# Patient Record
Sex: FEMALE | Race: WHITE | NOT HISPANIC OR LATINO | Employment: FULL TIME | URBAN - METROPOLITAN AREA
[De-identification: names, ages, dates, MRNs, and addresses within clinical notes are randomized per-mention and may not be internally consistent; named-entity substitution may affect disease eponyms.]

---

## 2020-02-26 ENCOUNTER — LAB (OUTPATIENT)
Dept: LAB | Facility: HOSPITAL | Age: 25
End: 2020-02-26

## 2020-02-26 ENCOUNTER — TRANSCRIBE ORDERS (OUTPATIENT)
Dept: LAB | Facility: HOSPITAL | Age: 25
End: 2020-02-26

## 2020-02-26 DIAGNOSIS — Z3A.17 17 WEEKS GESTATION OF PREGNANCY: Primary | ICD-10-CM

## 2020-02-26 DIAGNOSIS — Z34.82 PRENATAL CARE, SUBSEQUENT PREGNANCY, SECOND TRIMESTER: ICD-10-CM

## 2020-02-26 DIAGNOSIS — Z3A.17 17 WEEKS GESTATION OF PREGNANCY: ICD-10-CM

## 2020-02-26 LAB
ABO GROUP BLD: NORMAL
BASOPHILS # BLD AUTO: 0.03 10*3/MM3 (ref 0–0.2)
BASOPHILS NFR BLD AUTO: 0.4 % (ref 0–1.5)
BLD GP AB SCN SERPL QL: NEGATIVE
DEPRECATED RDW RBC AUTO: 42.9 FL (ref 37–54)
EOSINOPHIL # BLD AUTO: 0.1 10*3/MM3 (ref 0–0.4)
EOSINOPHIL NFR BLD AUTO: 1.2 % (ref 0.3–6.2)
ERYTHROCYTE [DISTWIDTH] IN BLOOD BY AUTOMATED COUNT: 13.5 % (ref 12.3–15.4)
GLUCOSE BLD-MCNC: 87 MG/DL (ref 65–99)
HBV SURFACE AG SERPL QL IA: NORMAL
HCT VFR BLD AUTO: 37.8 % (ref 34–46.6)
HCV AB SER DONR QL: NORMAL
HGB BLD-MCNC: 13 G/DL (ref 12–15.9)
HIV1+2 AB SER QL: NORMAL
IMM GRANULOCYTES # BLD AUTO: 0.08 10*3/MM3 (ref 0–0.05)
IMM GRANULOCYTES NFR BLD AUTO: 0.9 % (ref 0–0.5)
LYMPHOCYTES # BLD AUTO: 1.74 10*3/MM3 (ref 0.7–3.1)
LYMPHOCYTES NFR BLD AUTO: 20.4 % (ref 19.6–45.3)
MCH RBC QN AUTO: 30.4 PG (ref 26.6–33)
MCHC RBC AUTO-ENTMCNC: 34.4 G/DL (ref 31.5–35.7)
MCV RBC AUTO: 88.3 FL (ref 79–97)
MONOCYTES # BLD AUTO: 0.39 10*3/MM3 (ref 0.1–0.9)
MONOCYTES NFR BLD AUTO: 4.6 % (ref 5–12)
NEUTROPHILS # BLD AUTO: 6.17 10*3/MM3 (ref 1.7–7)
NEUTROPHILS NFR BLD AUTO: 72.5 % (ref 42.7–76)
NRBC BLD AUTO-RTO: 0 /100 WBC (ref 0–0.2)
PLATELET # BLD AUTO: 223 10*3/MM3 (ref 140–450)
PMV BLD AUTO: 10.9 FL (ref 6–12)
RBC # BLD AUTO: 4.28 10*6/MM3 (ref 3.77–5.28)
RH BLD: POSITIVE
RPR SER QL: NORMAL
WBC NRBC COR # BLD: 8.51 10*3/MM3 (ref 3.4–10.8)

## 2020-02-26 PROCEDURE — 80081 OBSTETRIC PANEL INC HIV TSTG: CPT

## 2020-02-26 PROCEDURE — 86803 HEPATITIS C AB TEST: CPT

## 2020-02-26 PROCEDURE — 36415 COLL VENOUS BLD VENIPUNCTURE: CPT

## 2020-02-26 PROCEDURE — 82947 ASSAY GLUCOSE BLOOD QUANT: CPT

## 2020-02-27 LAB
HOLD SPECIMEN: NORMAL
RUBV IGG SERPL IA-ACNC: NORMAL

## 2020-04-22 ENCOUNTER — TRANSCRIBE ORDERS (OUTPATIENT)
Dept: LAB | Facility: HOSPITAL | Age: 25
End: 2020-04-22

## 2020-04-22 DIAGNOSIS — Z3A.28 28 WEEKS GESTATION OF PREGNANCY: Primary | ICD-10-CM

## 2020-04-22 DIAGNOSIS — Z34.83 PRENATAL CARE, SUBSEQUENT PREGNANCY, THIRD TRIMESTER: ICD-10-CM

## 2020-04-22 LAB
BLD GP AB SCN SERPL QL: NEGATIVE
DEPRECATED RDW RBC AUTO: 40.8 FL (ref 37–54)
ERYTHROCYTE [DISTWIDTH] IN BLOOD BY AUTOMATED COUNT: 12.2 % (ref 12.3–15.4)
GLUCOSE 1H P 100 G GLC PO SERPL-MCNC: 199 MG/DL (ref 65–140)
HCT VFR BLD AUTO: 34 % (ref 34–46.6)
HGB BLD-MCNC: 11.7 G/DL (ref 12–15.9)
MCH RBC QN AUTO: 31.5 PG (ref 26.6–33)
MCHC RBC AUTO-ENTMCNC: 34.4 G/DL (ref 31.5–35.7)
MCV RBC AUTO: 91.6 FL (ref 79–97)
PLATELET # BLD AUTO: 193 10*3/MM3 (ref 140–450)
PMV BLD AUTO: 10.1 FL (ref 6–12)
RBC # BLD AUTO: 3.71 10*6/MM3 (ref 3.77–5.28)
WBC NRBC COR # BLD: 8.19 10*3/MM3 (ref 3.4–10.8)

## 2020-04-22 PROCEDURE — 36415 COLL VENOUS BLD VENIPUNCTURE: CPT | Performed by: OBSTETRICS & GYNECOLOGY

## 2020-04-22 PROCEDURE — 82950 GLUCOSE TEST: CPT | Performed by: OBSTETRICS & GYNECOLOGY

## 2020-04-22 PROCEDURE — 85027 COMPLETE CBC AUTOMATED: CPT | Performed by: OBSTETRICS & GYNECOLOGY

## 2020-04-22 PROCEDURE — 86850 RBC ANTIBODY SCREEN: CPT | Performed by: OBSTETRICS & GYNECOLOGY

## 2020-04-25 ENCOUNTER — HOSPITAL ENCOUNTER (OUTPATIENT)
Facility: HOSPITAL | Age: 25
Setting detail: OBSERVATION
Discharge: HOME | End: 2020-04-25
Attending: OBSTETRICS & GYNECOLOGY | Admitting: OBSTETRICS & GYNECOLOGY

## 2020-04-25 VITALS
DIASTOLIC BLOOD PRESSURE: 80 MMHG | BODY MASS INDEX: 32.61 KG/M2 | OXYGEN SATURATION: 97 % | WEIGHT: 191 LBS | HEART RATE: 110 BPM | TEMPERATURE: 98.1 F | RESPIRATION RATE: 16 BRPM | SYSTOLIC BLOOD PRESSURE: 124 MMHG | HEIGHT: 64 IN

## 2020-04-25 PROBLEM — O36.8190 DECREASED FETAL MOVEMENT: Status: ACTIVE | Noted: 2020-04-25

## 2020-04-25 PROCEDURE — G0378 HOSPITAL OBSERVATION PER HR: HCPCS

## 2020-04-25 PROCEDURE — 99218 PR INITIAL OBSERVATION CARE/DAY 30 MINUTES: CPT | Performed by: OBSTETRICS & GYNECOLOGY

## 2020-04-25 PROCEDURE — 59025 FETAL NON-STRESS TEST: CPT | Performed by: OBSTETRICS & GYNECOLOGY

## 2020-04-25 PROCEDURE — 59025 FETAL NON-STRESS TEST: CPT

## 2020-04-25 RX ORDER — PRENATAL WITH FERROUS FUM AND FOLIC ACID 3080; 920; 120; 400; 22; 1.84; 3; 20; 10; 1; 12; 200; 27; 25; 2 [IU]/1; [IU]/1; MG/1; [IU]/1; MG/1; MG/1; MG/1; MG/1; MG/1; MG/1; UG/1; MG/1; MG/1; MG/1; MG/1
1 TABLET ORAL DAILY
COMMUNITY

## 2020-04-25 NOTE — SIGNIFICANT NOTE
EFM d/c. Reactive nst. Pt verbalizes understanding of discharge instructions and fetal kick count.

## 2020-04-25 NOTE — H&P
[facility]  Obstetric History and Physical    Referring Provider: Conchita John MD      Chief Complaint   Patient presents with   • Decreased Fetal Movement     24 hrs       Subjective     Patient is a 24 y.o. female  currently at 28w3d, who presents with C/O decreased fetal movement.  She reports decreased fetal movement without any predisposing or other associated symptoms or concerns.  She denies leaking of fluid, vaginal bleeding, regular activity or any other concerns.  Prenatal care by Dr. John.  She states failed her 1 hour glucose tolerance test.  Plan to have further evaluation.  She current reports normal fetal activity after presenting to labor and delivery.        The following portions of the patients history were reviewed and updated as appropriate: current medications, allergies, past medical history, past surgical history, past family history, past social history and problem list .       Prenatal Information:   Maternal Prenatal Labs  Blood Type No results found for: ABO   Rh Status No results found for: RH   Antibody Screen Antibody Screen   Date Value Ref Range Status   2020 Negative  Final      Gonnorhea No results found for: GCCX   Chlamydia No results found for: CLAMYDCU   RPR No results found for: RPR   Syphilis Antibody No results found for: SYPHILIS   Rubella No results found for: RUBELLAIGGIN   Hepatitis B Surface Antigen No results found for: HEPBSAG   HIV-1 Antibody No results found for: LABHIV1   Hepatitis C Antibody No results found for: HEPCAB   Rapid Urin Drug Screen No results found for: AMPMETHU, BARBITSCNUR, LABBENZSCN, LABMETHSCN, LABOPIASCN, THCURSCR, COCAINEUR, AMPHETSCREEN, PROPOXSCN, BUPRENORSCNU, METAMPSCNUR, OXYCODONESCN, TRICYCLICSCN   Group B Strep Culture No results found for: GBSANTIGEN           External Prenatal Results     Pregnancy Outside Results - Transcribed From Office Records - See Scanned Records For Details     Test Value Date Time    Hgb  11.7 g/dL 20 0955      13.0 g/dL 20 1215    Hct 34.0 % 20 0955      37.8 % 20 1215    ABO A  20 1215    Rh Positive  20 1215    Antibody Screen Negative  20 0955      Negative  20 1215    Glucose Fasting GTT       Glucose Tolerance Test 1 hour       Glucose Tolerance Test 3 hour       Gonorrhea (discrete)       Chlamydia (discrete)       RPR Non-Reactive  20 1215    VDRL       Syphilis Antibody       Rubella Equivocal  20 1215    HBsAg Non-Reactive  20 1215    Herpes Simplex Virus PCR       Herpes Simplex VIrus Culture       HIV Non-Reactive  20 1215    Hep C RNA Quant PCR       Hep C Antibody Non-Reactive  20 1215    AFP       Group B Strep       GBS Susceptibility to Clindamycin       GBS Susceptibility to Erythromycin       Fetal Fibronectin       Genetic Testing, Maternal Blood             Drug Screening     Test Value Date Time    Urine Drug Screen       Amphetamine Screen       Barbiturate Screen       Benzodiazepine Screen       Methadone Screen       Phencyclidine Screen       Opiates Screen       THC Screen       Cocaine Screen       Propoxyphene Screen       Buprenorphine Screen       Methamphetamine Screen       Oxycodone Screen       Tricyclic Antidepressants Screen                     Past OB History:       OB History    Para Term  AB Living   2 0 0 0 1 0   SAB TAB Ectopic Molar Multiple Live Births   1 0 0 0 0 0      # Outcome Date GA Lbr Aditya/2nd Weight Sex Delivery Anes PTL Lv   2 Current            1 SAB 2018 10w0d    SAB   FD       Past Medical History: Past Medical History:   Diagnosis Date   • Anxiety     situational   • Gestational diabetes 2020      Past Surgical History Past Surgical History:   Procedure Laterality Date   • D&C WITH SUCTION  2018    sab      Family History: Family History   Problem Relation Age of Onset   • Hypertension Father    • Dementia Father    • No Known Problems Mother     • No Known Problems Brother       Social History:  reports that she has never smoked. She has never used smokeless tobacco.   reports that she drank alcohol.   reports that she does not use drugs.                   General ROS Negative Findings:Headaches, Visual Changes, Epigastric pain, Anorexia, Nausia/Vomiting, ROM and Vaginal Bleeding    ROS     All other systems have been reviewed and are neg  Objective       Vital Signs Range for the last 24 hours  Temperature: Temp:  [98.1 °F (36.7 °C)] 98.1 °F (36.7 °C)   Temp Source: Temp src: Oral   BP: BP: (124-128)/(80-84) 124/80   Pulse: Heart Rate:  [103-110] 110   Respirations: Resp:  [16] 16   SPO2: SpO2:  [96 %-97 %] 97 %   O2 Amount (l/min):     O2 Devices     Weight: Weight:  [86.6 kg (191 lb)] 86.6 kg (191 lb)     Physical Examination:   General:   alert, appears stated age and cooperative   Skin:   normal   HEENT:  Sclera clear   Lungs:      Heart:      Abdomen:  Soft, gravid uterus, no guarding, rebound benign exam   Lower Extremities  trace edema, no calf transfer range of motion   Pelvis:  Exam deferred.     Neuro: No focal deficits noted DTR 2+ 4 no clonus    Presentation:    Cervix: Exam by:     Dilation:     Effacement:     Station:         Fetal Heart Rate Assessment   Method: Fetal HR Assessment Method: external   Beats/min: Fetal HR (beats/min): 130   Baseline: Fetal Heart Baseline Rate: normal range   Varibility: Fetal HR Variability: moderate (amplitude range 6 to 25 bpm)   Accels: Fetal HR Accelerations: greater than/equal to 15 bpm, lasting at least 15 seconds   Decels: Fetal HR Decelerations: absent   Tracing Category:     NST-indications decreased fetal movement, interpretation reactive, moderate variability, accelerations present 10 x 10, no decelerations noted, onset 0740 endtime 0930 no ctx  Uterine Assessment   Method: Method: external tocotransducer   Frequency (min): Contraction Frequency (Minutes): 0   Ctx Count in 10 min:     Duration:      Intensity:     Intensity by IUPC:     Resting Tone: Uterine Resting Tone: soft by palpation   Resting Tone by IUPC:     [place] Units:       Laboratory Results:   Lab Results (last 24 hours)     ** No results found for the last 24 hours. **        Radiology Review:   Imaging Results (Last 24 Hours)     ** No results found for the last 24 hours. **        Other Studies:    Assessment/Plan       Decreased fetal movement        Assessment:  1.  Intrauterine pregnancy at 28w3d weeks gestation with reactive fetal status.    2.  History of decreased fetal movement, currently reports normal fetal activity  3.   4.      Plan:  1. D/C to home, kick count, follow-up with OB routine or as needed  2. Plan of care has been reviewed with patient.  3.  Risks, benefits of treatment plan have been discussed.  4.  All questions have been answered.  5      Vikram Noyola DO  4/25/2020  09:23

## 2020-05-04 ENCOUNTER — OFFICE VISIT (OUTPATIENT)
Dept: ENDOCRINOLOGY | Facility: CLINIC | Age: 25
End: 2020-05-04

## 2020-05-04 VITALS
OXYGEN SATURATION: 98 % | SYSTOLIC BLOOD PRESSURE: 122 MMHG | DIASTOLIC BLOOD PRESSURE: 84 MMHG | WEIGHT: 199.8 LBS | BODY MASS INDEX: 34.11 KG/M2 | HEART RATE: 105 BPM | HEIGHT: 64 IN

## 2020-05-04 DIAGNOSIS — O24.419 GESTATIONAL DIABETES MELLITUS (GDM) IN THIRD TRIMESTER, GESTATIONAL DIABETES METHOD OF CONTROL UNSPECIFIED: Primary | ICD-10-CM

## 2020-05-04 LAB
GLUCOSE BLDC GLUCOMTR-MCNC: 89 MG/DL (ref 70–130)
HBA1C MFR BLD: 5.2 %

## 2020-05-04 PROCEDURE — 82947 ASSAY GLUCOSE BLOOD QUANT: CPT | Performed by: INTERNAL MEDICINE

## 2020-05-04 PROCEDURE — 83036 HEMOGLOBIN GLYCOSYLATED A1C: CPT | Performed by: INTERNAL MEDICINE

## 2020-05-04 PROCEDURE — 99243 OFF/OP CNSLTJ NEW/EST LOW 30: CPT | Performed by: INTERNAL MEDICINE

## 2020-05-04 RX ORDER — BLOOD-GLUCOSE METER
KIT MISCELLANEOUS
Qty: 1 EACH | Refills: 0 | Status: SHIPPED | OUTPATIENT
Start: 2020-05-04 | End: 2020-05-04 | Stop reason: SDUPTHER

## 2020-05-04 RX ORDER — BLOOD-GLUCOSE METER
KIT MISCELLANEOUS
Qty: 1 EACH | Refills: 0 | Status: SHIPPED | OUTPATIENT
Start: 2020-05-04

## 2020-05-04 NOTE — PROGRESS NOTES
Chief Complaint   Patient presents with   • Gestational Diabetes     1st pregnancy with GDM - 29w5d         Referring Provider  Conchita John MD     HPI   Winifred Ireland is a 24 y.o. female had concerns including Gestational Diabetes (1st pregnancy with GDM - 29w5d ).    Gestational diabetes was diagnosed 4/22/20.  OGTT results: 50 gm test   Prior GDM: no  Weeks of gestation: 29.5  Gender: girl, last US was 20 weeks. Will see OB in a few weeks.     Diet: was eating too many carbs, sweets, and too much juice (was drinking all day).   Is eating baked meats, vegetables, and low sugar fruit. Has almost completely eliminated carbs.     Weight gain so far about 40 lbs.     Past Medical History:   Diagnosis Date   • Anxiety 2013    situational   • Gestational diabetes 2020     Past Surgical History:   Procedure Laterality Date   • D&C WITH SUCTION  07/2018    sab      Family History   Problem Relation Age of Onset   • Hypertension Father    • Dementia Father    • No Known Problems Mother    • No Known Problems Brother       Social History     Socioeconomic History   • Marital status: Single     Spouse name: Not on file   • Number of children: Not on file   • Years of education: Not on file   • Highest education level: Not on file   Tobacco Use   • Smoking status: Never Smoker   • Smokeless tobacco: Never Used   Substance and Sexual Activity   • Alcohol use: Not Currently   • Drug use: Never   • Sexual activity: Yes     Partners: Male      No Known Allergies   Current Outpatient Medications on File Prior to Visit   Medication Sig Dispense Refill   • Prenatal Vit-Fe Fumarate-FA (PRENATAL 27-1) 27-1 MG tablet tablet Take 1 tablet by mouth Daily.       No current facility-administered medications on file prior to visit.         Review of Systems   Constitutional: Negative.    HENT: Negative.    Eyes: Negative.    Respiratory: Negative.    Cardiovascular: Negative.    Gastrointestinal: Negative.    Endocrine:  "Negative.    Genitourinary: Negative.    Musculoskeletal: Negative.    Skin: Negative.    Allergic/Immunologic: Negative.    Neurological: Negative.    Hematological: Negative.    Psychiatric/Behavioral: Negative.       ROS was reviewed and verified. All other ROS negative.    /84 (BP Location: Right arm, Patient Position: Sitting, Cuff Size: Adult)   Pulse 105   Ht 162.6 cm (64\")   Wt 90.6 kg (199 lb 12.8 oz)   LMP 10/09/2019   SpO2 98%   Breastfeeding No   BMI 34.30 kg/m²      Physical Exam  Constitutional:  well developed; well nourished  no acute distress   ENT/Thyroid: no thyromegaly  no palpable nodules   Eyes: EOM intact  Conjunctiva: clear   Respiratory:  breathing is unlabored  clear to auscultation bilaterally   Cardiovascular:  regular rate and rhythm, S1, S2 normal, no murmur, click, rub or gallop   Chest:  Not performed.   Abdomen: gravid   : Not performed.   Musculoskeletal: negative findings:  ROM of all joints is normal, no deformities present   Skin: dry and warm   Neuro: normal without focal findings, mental status, speech normal, alert and oriented x3 and LISA   Psych: oriented to time, place and person, mood and affect are within normal limits     HbA1c:  Lab Results   Component Value Date    HGBA1C 5.2 05/04/2020     Glucose:  Lab Results   Component Value Date    POCGLU 89 05/04/2020       Assessment and Plan    Winifred was seen today for gestational diabetes.    Diagnoses and all orders for this visit:    Gestational diabetes mellitus (GDM) in third trimester, gestational diabetes method of control unspecified  29.5 weeks with a girl - first with GDM.   A1c is normal at 5.2.   She has made significant modifications to her diet (eliminate juice and nearly eliminated carbs). She should consume some carbs - whole grains and wheat - and check urine ketones to ensure she isn't in ketosis.  Management of gestational diabetes was discussed in detail.  Patient was provided with dietary " guidelines including carbohydrate targets with meals/snacks as well as BG targets were provided: Fasting less than 95, 2 hours postprandial less than 140, 1 hour postprandial less than 120.    Potential complications related to uncontrolled gestational diabetes were also discussed including, but not limited to, LGA and macrosomia, stillbirth, polyhydramnios,  morbidity including hypoglycemia, increased risk for DM2 beyond pregnancy and risk for GDM with future pregnancies.  Discussed expected changes including weight loss and potential increases in blood sugar during pregnancy.  I have asked that she monitor her BG's 6-8 times per day, fasting, 1 hour postprandial, 2 hours postprandial and nightly and return in 2 weeks for follow-up.  If BG's are not at target despite dietary modifications, will add medication. Pt was advised to contact our office prior to follow-up if BGs are not at target.   If insurance does not cover strips adequately, she may need to purchase an over-the-counter meter and strips in order to monitor BG's as directed.  -     POC Glycosylated Hemoglobin (Hb A1C)  -     POC Glucose  -     acetone, urine, test strip; 1 strip Daily.  -     glucose blood test strip; Use 8 times daily to monitor BG  -     glucose monitor monitoring kit; Use to monitor BG up to 8 times daily  -     Lancets Misc. kit; Use to monitor BG up to 8 times daily     Return in about 2 weeks (around 2020). The patient was instructed to contact the clinic with any interval questions or concerns.    Isadora Kasper, DO   Endocrinologist    Please note that portions of this document were completed with a voice recognition program. Efforts were made to edit the dictations, but occasionally words are mis-transcribed.

## 2020-05-12 ENCOUNTER — PRIOR AUTHORIZATION (OUTPATIENT)
Dept: ENDOCRINOLOGY | Facility: CLINIC | Age: 25
End: 2020-05-12

## 2020-05-19 ENCOUNTER — OFFICE VISIT (OUTPATIENT)
Dept: ENDOCRINOLOGY | Facility: CLINIC | Age: 25
End: 2020-05-19

## 2020-05-19 VITALS
HEART RATE: 124 BPM | DIASTOLIC BLOOD PRESSURE: 74 MMHG | HEIGHT: 64 IN | SYSTOLIC BLOOD PRESSURE: 124 MMHG | WEIGHT: 200.8 LBS | BODY MASS INDEX: 34.28 KG/M2 | OXYGEN SATURATION: 98 %

## 2020-05-19 DIAGNOSIS — O24.410 DIET CONTROLLED GESTATIONAL DIABETES MELLITUS (GDM) IN THIRD TRIMESTER: Primary | ICD-10-CM

## 2020-05-19 PROCEDURE — 99213 OFFICE O/P EST LOW 20 MIN: CPT | Performed by: INTERNAL MEDICINE

## 2020-05-19 RX ORDER — BLOOD-GLUCOSE METER
EACH MISCELLANEOUS
COMMUNITY
Start: 2020-05-04

## 2020-05-19 RX ORDER — LANCETS
EACH MISCELLANEOUS
COMMUNITY
Start: 2020-05-05

## 2020-05-19 NOTE — PROGRESS NOTES
"Chief Complaint   Patient presents with   • Gestational Diabetes     Follow up        HPI   Winifred Ireland is a 24 y.o. female had concerns including Gestational Diabetes (Follow up).    She is checking blood sugars 4 times per day.  All BGs are at target ranges. Ketones staying low.     She is 31.6 weeks with a girl and baby is measuring on schedule.    Pt is feeling well and has no new concerns or complaints.     The following portions of the patient's history were reviewed and updated as appropriate: allergies, current medications, past family history, past medical history, past social history, past surgical history and problem list.    Review of Systems   Constitutional: Negative.    HENT: Negative.    Eyes: Negative.    Respiratory: Negative.    Cardiovascular: Negative.    Gastrointestinal: Negative.    Endocrine: Negative.    Genitourinary: Negative.    Musculoskeletal: Negative.    Skin: Negative.    Allergic/Immunologic: Negative.    Neurological: Negative.    Hematological: Negative.    Psychiatric/Behavioral: Negative.       ROS was reviewed and verified. All other ROS negative.    /74 (BP Location: Right arm, Patient Position: Sitting, Cuff Size: Adult)   Pulse (!) 124   Ht 162.6 cm (64\")   Wt 91.1 kg (200 lb 12.8 oz)   LMP 10/09/2019   SpO2 98%   Breastfeeding No   BMI 34.47 kg/m²      Physical Exam     Constitutional:  well developed; well nourished  no acute distress   ENT/Thyroid: not examined   Eyes: EOM intact  Conjunctiva: clear   Respiratory:  breathing is unlabored   Cardiovascular:  Not performed.   Chest:  Not performed.   Abdomen: Not performed.   : Not performed.   Musculoskeletal: negative findings:  ROM of all joints is normal, no deformities present   Skin: not performed.   Neuro: normal without focal findings, mental status, speech normal, alert and oriented x3 and LISA   Psych: oriented to time, place and person, mood and affect are within normal limits     HbA1c:  Lab " Results   Component Value Date    HGBA1C 5.2 05/04/2020     Glucose:    Lab Results   Component Value Date    POCGLU 89 05/04/2020       Assessment and Plan    Winifred was seen today for gestational diabetes.    Diagnoses and all orders for this visit:    Diet controlled gestational diabetes mellitus (GDM) in third trimester  31.6 weeks with a girl and baby measuring on schedule. All BGs at goal.   Ketones are low.   Discussed that her blood sugars could continue to rise until she is 35 to 37 weeks and therefore she may have to make further modifications in her diet to maintain blood sugar levels within target ranges.  Continue checking 4 times daily.  Send BG logs every 1 to 2 weeks.     Return in about 6 weeks (around 6/30/2020) for next scheduled follow up. The patient was instructed to contact the clinic with any interval questions or concerns.    Isadora Kasper, DO   Endocrinologist    Please note that portions of this document were completed with a voice recognition program. Efforts were made to edit the dictations, but occasionally words are mis-transcribed.

## 2020-05-20 ENCOUNTER — TRANSCRIBE ORDERS (OUTPATIENT)
Dept: LAB | Facility: HOSPITAL | Age: 25
End: 2020-05-20

## 2020-05-20 ENCOUNTER — LAB (OUTPATIENT)
Dept: LAB | Facility: HOSPITAL | Age: 25
End: 2020-05-20

## 2020-05-20 DIAGNOSIS — Z3A.32 PREGNANCY WITH 32 COMPLETED WEEKS GESTATION: ICD-10-CM

## 2020-05-20 DIAGNOSIS — Z3A.32 PREGNANCY WITH 32 COMPLETED WEEKS GESTATION: Primary | ICD-10-CM

## 2020-05-20 LAB
ALP SERPL-CCNC: 104 U/L (ref 39–117)
ALT SERPL W P-5'-P-CCNC: 14 U/L (ref 1–33)
AST SERPL-CCNC: 17 U/L (ref 1–32)
BILIRUB SERPL-MCNC: <0.2 MG/DL (ref 0.2–1.2)
CREAT BLD-MCNC: 0.41 MG/DL (ref 0.57–1)
LDH SERPL-CCNC: 218 U/L (ref 135–214)
URATE SERPL-MCNC: 3.6 MG/DL (ref 2.4–5.7)

## 2020-05-20 PROCEDURE — 36415 COLL VENOUS BLD VENIPUNCTURE: CPT

## 2020-05-20 PROCEDURE — 83615 LACTATE (LD) (LDH) ENZYME: CPT

## 2020-05-20 PROCEDURE — 85027 COMPLETE CBC AUTOMATED: CPT

## 2020-05-20 PROCEDURE — 82247 BILIRUBIN TOTAL: CPT

## 2020-05-20 PROCEDURE — 84450 TRANSFERASE (AST) (SGOT): CPT

## 2020-05-20 PROCEDURE — 84550 ASSAY OF BLOOD/URIC ACID: CPT

## 2020-05-20 PROCEDURE — 84460 ALANINE AMINO (ALT) (SGPT): CPT

## 2020-05-20 PROCEDURE — 82565 ASSAY OF CREATININE: CPT

## 2020-05-20 PROCEDURE — 84075 ASSAY ALKALINE PHOSPHATASE: CPT

## 2020-05-20 PROCEDURE — 81001 URINALYSIS AUTO W/SCOPE: CPT

## 2020-05-21 LAB
BACTERIA UR QL AUTO: ABNORMAL /HPF
BILIRUB UR QL STRIP: NEGATIVE
CLARITY UR: ABNORMAL
COLOR UR: YELLOW
DEPRECATED RDW RBC AUTO: 38.6 FL (ref 37–54)
ERYTHROCYTE [DISTWIDTH] IN BLOOD BY AUTOMATED COUNT: 12 % (ref 12.3–15.4)
GLUCOSE UR STRIP-MCNC: NEGATIVE MG/DL
HCT VFR BLD AUTO: 35.6 % (ref 34–46.6)
HGB BLD-MCNC: 12 G/DL (ref 12–15.9)
HGB UR QL STRIP.AUTO: NEGATIVE
HYALINE CASTS UR QL AUTO: ABNORMAL /LPF
KETONES UR QL STRIP: NEGATIVE
LEUKOCYTE ESTERASE UR QL STRIP.AUTO: ABNORMAL
MCH RBC QN AUTO: 29.7 PG (ref 26.6–33)
MCHC RBC AUTO-ENTMCNC: 33.7 G/DL (ref 31.5–35.7)
MCV RBC AUTO: 88.1 FL (ref 79–97)
NITRITE UR QL STRIP: NEGATIVE
PH UR STRIP.AUTO: 7.5 [PH] (ref 5–8)
PLATELET # BLD AUTO: 197 10*3/MM3 (ref 140–450)
PMV BLD AUTO: 10.3 FL (ref 6–12)
PROT UR QL STRIP: NEGATIVE
RBC # BLD AUTO: 4.04 10*6/MM3 (ref 3.77–5.28)
RBC # UR: ABNORMAL /HPF
REF LAB TEST METHOD: ABNORMAL
SP GR UR STRIP: 1.02 (ref 1–1.03)
SQUAMOUS #/AREA URNS HPF: ABNORMAL /HPF
UROBILINOGEN UR QL STRIP: ABNORMAL
WBC NRBC COR # BLD: 8.18 10*3/MM3 (ref 3.4–10.8)
WBC UR QL AUTO: ABNORMAL /HPF

## 2020-05-23 ENCOUNTER — HOSPITAL ENCOUNTER (OUTPATIENT)
Facility: HOSPITAL | Age: 25
Setting detail: OBSERVATION
Discharge: HOME | End: 2020-05-23
Attending: OBSTETRICS & GYNECOLOGY | Admitting: OBSTETRICS & GYNECOLOGY

## 2020-05-23 VITALS
HEIGHT: 63 IN | WEIGHT: 200 LBS | DIASTOLIC BLOOD PRESSURE: 73 MMHG | SYSTOLIC BLOOD PRESSURE: 129 MMHG | RESPIRATION RATE: 16 BRPM | BODY MASS INDEX: 35.44 KG/M2 | TEMPERATURE: 98.6 F | HEART RATE: 104 BPM | OXYGEN SATURATION: 99 %

## 2020-05-23 PROBLEM — I10 HTN (HYPERTENSION): Status: ACTIVE | Noted: 2020-05-23

## 2020-05-23 PROBLEM — O24.410 DIET CONTROLLED GESTATIONAL DIABETES MELLITUS (GDM): Status: ACTIVE | Noted: 2020-05-23

## 2020-05-23 LAB
EXPIRATION DATE: ABNORMAL
GLUCOSE BLDC GLUCOMTR-MCNC: 79 MG/DL (ref 70–130)
Lab: ABNORMAL
PROT UR STRIP-MCNC: ABNORMAL MG/DL

## 2020-05-23 PROCEDURE — 59025 FETAL NON-STRESS TEST: CPT | Performed by: OBSTETRICS & GYNECOLOGY

## 2020-05-23 PROCEDURE — 81002 URINALYSIS NONAUTO W/O SCOPE: CPT | Performed by: OBSTETRICS & GYNECOLOGY

## 2020-05-23 PROCEDURE — 82962 GLUCOSE BLOOD TEST: CPT

## 2020-05-23 PROCEDURE — G0378 HOSPITAL OBSERVATION PER HR: HCPCS

## 2020-05-23 PROCEDURE — 59025 FETAL NON-STRESS TEST: CPT

## 2020-05-23 PROCEDURE — 99218 PR INITIAL OBSERVATION CARE/DAY 30 MINUTES: CPT | Performed by: OBSTETRICS & GYNECOLOGY

## 2020-05-23 NOTE — H&P
[facility]  Obstetric History and Physical    Referring Provider: No Known Provider      Chief Complaint   Patient presents with   • Hypertension     States elevated BPs since 28 weeks gestation. No BP meds. +FM, no leaking bleeding. HA around 1030 that went away after drinking fluids. Blurry vision, no epigastic pain. GDM- diet controlled.       Subjective     Patient is a 24 y.o. female  currently at 32w3d, who presents with C/O hypertension.  She reports having elevated blood pressure today with 30 normal daily blood pressure assessment.  Patient denies any leaking of fluid, vaginal bleeding, shortness of breath, chest pain, epigastric pain, nausea vomiting, regular urine activity, or any other concerns.  Patient reports normal fetal activity.  Prenatal care by Dr. John.  Prenatal care complicated by gestational hypertension diagnosed at 20 weeks gestation.  Patient without any antihypertensive medication and is hearing assessed weekly by labs and home blood pressure monitoring..    Her prenatal care is complicated by  hypertension  gestational hypertension.  Her previous obstetric/gynecological history is remarkable for .    The following portions of the patients history were reviewed and updated as appropriate: current medications, allergies, past medical history, past surgical history, past family history, past social history and problem list .       Prenatal Information:   Maternal Prenatal Labs  Blood Type No results found for: ABO   Rh Status No results found for: RH   Antibody Screen No results found for: ABSCRN   Gonnorhea No results found for: GCCX   Chlamydia No results found for: CLAMYDCU   RPR No results found for: RPR   Syphilis Antibody No results found for: SYPHILIS   Rubella No results found for: RUBELLAIGGIN   Hepatitis B Surface Antigen No results found for: HEPBSAG   HIV-1 Antibody No results found for: LABHIV1   Hepatitis C Antibody No results found for: HEPCAB   Rapid Urin Drug  Screen No results found for: AMPMETHU, BARBITSCNUR, LABBENZSCN, LABMETHSCN, LABOPIASCN, THCURSCR, COCAINEUR, AMPHETSCREEN, PROPOXSCN, BUPRENORSCNU, METAMPSCNUR, OXYCODONESCN, TRICYCLICSCN   Group B Strep Culture No results found for: GBSANTIGEN           External Prenatal Results     Pregnancy Outside Results - Transcribed From Office Records - See Scanned Records For Details     Test Value Date Time    Hgb 12.0 g/dL 20 1638      11.7 g/dL 20 0955      13.0 g/dL 20 1215    Hct 35.6 % 20 1638      34.0 % 20 0955      37.8 % 20 1215    ABO A  20 1215    Rh Positive  20 1215    Antibody Screen Negative  20 0955      Negative  20 1215    Glucose Fasting GTT       Glucose Tolerance Test 1 hour       Glucose Tolerance Test 3 hour       Gonorrhea (discrete)       Chlamydia (discrete)       RPR Non-Reactive  20 1215    VDRL       Syphilis Antibody       Rubella Equivocal  20 1215    HBsAg Non-Reactive  20 1215    Herpes Simplex Virus PCR       Herpes Simplex VIrus Culture       HIV Non-Reactive  20 1215    Hep C RNA Quant PCR       Hep C Antibody Non-Reactive  20 1215    AFP       Group B Strep       GBS Susceptibility to Clindamycin       GBS Susceptibility to Erythromycin       Fetal Fibronectin       Genetic Testing, Maternal Blood             Drug Screening     Test Value Date Time    Urine Drug Screen       Amphetamine Screen       Barbiturate Screen       Benzodiazepine Screen       Methadone Screen       Phencyclidine Screen       Opiates Screen       THC Screen       Cocaine Screen       Propoxyphene Screen       Buprenorphine Screen       Methamphetamine Screen       Oxycodone Screen       Tricyclic Antidepressants Screen                     Past OB History:       OB History    Para Term  AB Living   2 0 0 0 1 0   SAB TAB Ectopic Molar Multiple Live Births   1 0 0 0 0 0      # Outcome Date GA Lbr Aditya/2nd Weight  Sex Delivery Anes PTL Lv   2 Current            1 SAB 07/2018 10w0d    SAB   FD       Past Medical History: Past Medical History:   Diagnosis Date   • Anxiety 2013    situational- states she had some chest pain earliet today   • Chlamydia 11/2019    treeated   • Depression     no meds   • Gestational diabetes 2020   • Hypertension affecting pregnancy     since 28 weeks      Past Surgical History Past Surgical History:   Procedure Laterality Date   • D&C WITH SUCTION  07/2018    sab      Family History: Family History   Problem Relation Age of Onset   • Hypertension Father    • Dementia Father    • No Known Problems Mother    • No Known Problems Brother       Social History:  reports that she has never smoked. She has never used smokeless tobacco.   reports that she drank alcohol.   reports that she does not use drugs.                   General ROS Negative Findings:Headaches, Visual Changes, Epigastric pain, Anorexia, Nausia/Vomiting, ROM and Vaginal Bleeding    ROS     All other systems have been reviewed and are neg  Objective       Vital Signs Range for the last 24 hours  Temperature: Temp:  [98.6 °F (37 °C)] 98.6 °F (37 °C)   Temp Source: Temp src: Oral   BP: BP: (125-138)/(73-80) 129/73   Pulse: Heart Rate:  [109-118] 109   Respirations: Resp:  [16] 16   SPO2:  98% on room air   O2 Amount (l/min):     O2 Devices     Weight: Weight:  [90.7 kg (200 lb)] 90.7 kg (200 lb)     Physical Examination:   General:   alert, appears stated age and cooperative   Skin:   normal   HEENT:  Sclera clear   Lungs:      Heart:      Abdomen:  Soft, gravid uterus, no guarding, rebound benign exam   Lower Extremities  no edema, no calf transfer range of motion   Pelvis:  Exam deferred.     Neuro no focal deficits DTR 2+ 4 no clonus        Fetal Heart Rate Assessment   Method: Fetal HR Assessment Method: external   Beats/min: Fetal HR (beats/min): 130   Baseline: Fetal Heart Baseline Rate: normal range   Varibility: Fetal HR  Variability: moderate (amplitude range 6 to 25 bpm)   Accels: Fetal HR Accelerations: episodic, greater than/equal to 15 bpm, lasting at least 15 seconds   Decels: Fetal HR Decelerations: variable   Tracing Category:     NST-indications gestational hypertension, interpretation reactive, moderate variability, accelerations present 15 x 15, occasional variable deceleration appropriate for gestational age, no contractions noted, onset 1345, end time 1447  Uterine Assessment   Method: Method: external tocotransducer   Frequency (min):     Ctx Count in 10 min:     Duration:     Intensity: Contraction Intensity: no contractions   Intensity by IUPC:     Resting Tone: Uterine Resting Tone: soft by palpation   Resting Tone by IUPC:     [place] Units:       Laboratory Results:   Lab Results (last 24 hours)     Procedure Component Value Units Date/Time    POC Glucose Once [[id number]]  (Normal) Collected:  20 1357    Specimen:  Blood Updated:  20 1400     Glucose 79 mg/dL         Radiology Review:   Imaging Results (Last 24 Hours)     ** No results found for the last 24 hours. **        Other Studies:    Assessment/Plan       HTN (hypertension)    Diet controlled gestational diabetes mellitus (GDM)        Assessment:  1.  Intrauterine pregnancy at 32w3d weeks gestation with reactive fetal status.    2.  Gestational hypertension without any evidence of severe manifestations(normal CBC MPP on 20)  3.  GDMA1 -well-controlled  4.      Plan:  1. D/C to home, continue twice daily blood pressure evaluation, kick count, continue home medications, follow-up with Dr. John on Wednesday or PRN for routine  visit.  2. Plan of care has been reviewed with patient.  3.  Risks, benefits of treatment plan have been discussed.  4.  All questions have been answered.  5      Vikram Noyola DO  2020  14:52

## 2020-06-18 LAB — EXTERNAL GROUP B STREP ANTIGEN: NORMAL

## 2020-06-25 ENCOUNTER — HOSPITAL ENCOUNTER (OUTPATIENT)
Facility: HOSPITAL | Age: 25
End: 2020-06-25
Attending: OBSTETRICS & GYNECOLOGY | Admitting: OBSTETRICS & GYNECOLOGY

## 2020-06-25 ENCOUNTER — HOSPITAL ENCOUNTER (OUTPATIENT)
Facility: HOSPITAL | Age: 25
Setting detail: OBSERVATION
Discharge: HOME | End: 2020-06-25
Attending: OBSTETRICS & GYNECOLOGY | Admitting: OBSTETRICS & GYNECOLOGY

## 2020-06-25 VITALS
WEIGHT: 201 LBS | RESPIRATION RATE: 16 BRPM | BODY MASS INDEX: 35.61 KG/M2 | HEART RATE: 116 BPM | SYSTOLIC BLOOD PRESSURE: 121 MMHG | TEMPERATURE: 97.9 F | DIASTOLIC BLOOD PRESSURE: 68 MMHG

## 2020-06-25 PROBLEM — Z34.90 PREGNANCY: Status: ACTIVE | Noted: 2020-06-25

## 2020-06-25 PROCEDURE — 84112 EVAL AMNIOTIC FLUID PROTEIN: CPT | Performed by: OBSTETRICS & GYNECOLOGY

## 2020-06-25 PROCEDURE — 99218 PR INITIAL OBSERVATION CARE/DAY 30 MINUTES: CPT | Performed by: OBSTETRICS & GYNECOLOGY

## 2020-06-25 PROCEDURE — 59025 FETAL NON-STRESS TEST: CPT

## 2020-06-25 PROCEDURE — 59025 FETAL NON-STRESS TEST: CPT | Performed by: OBSTETRICS & GYNECOLOGY

## 2020-06-25 PROCEDURE — G0378 HOSPITAL OBSERVATION PER HR: HCPCS

## 2020-06-26 LAB — POC AMNISURE: NEGATIVE

## 2020-06-26 PROCEDURE — 84112 EVAL AMNIOTIC FLUID PROTEIN: CPT | Performed by: OBSTETRICS & GYNECOLOGY

## 2020-07-01 ENCOUNTER — OFFICE VISIT (OUTPATIENT)
Dept: ENDOCRINOLOGY | Facility: CLINIC | Age: 25
End: 2020-07-01

## 2020-07-01 VITALS
TEMPERATURE: 97.4 F | HEART RATE: 101 BPM | OXYGEN SATURATION: 98 % | SYSTOLIC BLOOD PRESSURE: 132 MMHG | BODY MASS INDEX: 36.5 KG/M2 | RESPIRATION RATE: 16 BRPM | DIASTOLIC BLOOD PRESSURE: 72 MMHG | HEIGHT: 63 IN | WEIGHT: 206 LBS

## 2020-07-01 DIAGNOSIS — O24.410 DIET CONTROLLED GESTATIONAL DIABETES MELLITUS (GDM) IN THIRD TRIMESTER: Primary | ICD-10-CM

## 2020-07-01 PROCEDURE — 99213 OFFICE O/P EST LOW 20 MIN: CPT | Performed by: INTERNAL MEDICINE

## 2020-07-01 NOTE — PROGRESS NOTES
"Chief Complaint   Patient presents with   • Gestational Diabetes        HPI   Winifred Ireland is a 24 y.o. female had concerns including Gestational Diabetes.    She is checking blood sugars 4 times per day.   Glucose log reviewed and she had one high fasting in the last 2 weeks with a glucose of 96 - after a night of poor sleep. All of her post-prandial BGs are on target.   She hasn't been sleeping well but is feeling well otherwise.  She is 38 weeks with a girl and baby is measuring on schedule.    Induction is scheduled in a week.    The following portions of the patient's history were reviewed and updated as appropriate: allergies, current medications, past family history, past medical history, past social history, past surgical history and problem list.    Review of Systems   Constitutional: Negative.    HENT: Negative.    Eyes: Negative.    Respiratory: Negative.    Cardiovascular: Negative.    Gastrointestinal: Negative.    Endocrine: Negative.    Genitourinary: Negative.    Psychiatric/Behavioral: Positive for sleep disturbance.      ROS was reviewed and verified. All other ROS negative.    /72   Pulse 101   Temp 97.4 °F (36.3 °C) (Temporal)   Resp 16   Ht 160 cm (62.99\")   Wt 93.4 kg (206 lb)   LMP 10/09/2019   SpO2 98%   BMI 36.50 kg/m²      Physical Exam     Constitutional:  well developed; well nourished  no acute distress   ENT/Thyroid: not examined   Eyes: EOM intact  Conjunctiva: clear   Respiratory:  breathing is unlabored   Cardiovascular:  Not performed.   Chest:  Not performed.   Abdomen: Not performed.   : Not performed.   Musculoskeletal: Normal gait   Skin: not performed.   Neuro: normal without focal findings and mental status, speech normal, alert and oriented x3   Psych: oriented to time, place and person, mood and affect are within normal limits     HbA1c:  Lab Results   Component Value Date    HGBA1C 5.2 05/04/2020     Glucose:    Lab Results   Component Value Date    " POCGLU 79 05/23/2020         Assessment and Plan    Winifred was seen today for gestational diabetes.    Diagnoses and all orders for this visit:    Diet controlled gestational diabetes mellitus (GDM) in third trimester  38 weeks with a girl and measuring on schedule. Induction scheduled for 7/8/20. Most all BGs are at target with the exception of one fasting of 96 in the last two weeks due to poor sleep.   A1c 5.2.   Once labor/induction has started, BG monitoring routinely is no longer needed. She can mostly resume her normal diet though I recommend she avoid excess carbs or concentrated sweets.   Discussed the increased long-term risk to develop type 2 diabetes and potential gestational diabetes again with future pregnancies.   Advised that she lose all weight gained during pregnancy plus extra if needed.  For potential future pregnancies, start the diet advised during gestational diabetes at the first sign of pregnancy.   Follow-up in 2 to 3 months to screen for possible persistent BG issues.      Return in about 3 months (around 10/1/2020) for next scheduled follow up. The patient was instructed to contact the clinic with any interval questions or concerns.    Isadora Kasper, DO   Endocrinologist    Please note that portions of this document were completed with a voice recognition program. Efforts were made to edit the dictations, but occasionally words are mis-transcribed.

## 2020-07-05 ENCOUNTER — APPOINTMENT (OUTPATIENT)
Dept: PREADMISSION TESTING | Facility: HOSPITAL | Age: 25
End: 2020-07-05

## 2020-07-05 PROCEDURE — C9803 HOPD COVID-19 SPEC COLLECT: HCPCS

## 2020-07-05 PROCEDURE — U0002 COVID-19 LAB TEST NON-CDC: HCPCS

## 2020-07-06 LAB
REF LAB TEST METHOD: NORMAL
SARS-COV-2 RNA RESP QL NAA+PROBE: NOT DETECTED

## 2020-07-08 ENCOUNTER — HOSPITAL ENCOUNTER (INPATIENT)
Dept: LABOR AND DELIVERY | Facility: HOSPITAL | Age: 25
LOS: 3 days | Discharge: HOME | End: 2020-07-11
Attending: OBSTETRICS & GYNECOLOGY | Admitting: OBSTETRICS & GYNECOLOGY

## 2020-07-08 PROBLEM — Z3A.39 PREGNANCY WITH 39 COMPLETED WEEKS GESTATION: Status: ACTIVE | Noted: 2020-07-08

## 2020-07-08 LAB
ABO GROUP BLD: NORMAL
ALP SERPL-CCNC: 161 U/L (ref 39–117)
ALT SERPL W P-5'-P-CCNC: 15 U/L (ref 1–33)
AST SERPL-CCNC: 20 U/L (ref 1–32)
BILIRUB SERPL-MCNC: <0.2 MG/DL (ref 0–1.2)
BLD GP AB SCN SERPL QL: NEGATIVE
CREAT SERPL-MCNC: 0.41 MG/DL (ref 0.57–1)
DEPRECATED RDW RBC AUTO: 43.7 FL (ref 37–54)
ERYTHROCYTE [DISTWIDTH] IN BLOOD BY AUTOMATED COUNT: 13.4 % (ref 12.3–15.4)
GLUCOSE BLDC GLUCOMTR-MCNC: 92 MG/DL (ref 70–130)
HCT VFR BLD AUTO: 34 % (ref 34–46.6)
HGB BLD-MCNC: 10.7 G/DL (ref 12–15.9)
LDH SERPL-CCNC: 230 U/L (ref 135–214)
MCH RBC QN AUTO: 28.3 PG (ref 26.6–33)
MCHC RBC AUTO-ENTMCNC: 31.5 G/DL (ref 31.5–35.7)
MCV RBC AUTO: 89.9 FL (ref 79–97)
PLATELET # BLD AUTO: 166 10*3/MM3 (ref 140–450)
PMV BLD AUTO: 10.4 FL (ref 6–12)
RBC # BLD AUTO: 3.78 10*6/MM3 (ref 3.77–5.28)
RH BLD: POSITIVE
T&S EXPIRATION DATE: NORMAL
URATE SERPL-MCNC: 4.2 MG/DL (ref 2.4–5.7)
WBC # BLD AUTO: 8.95 10*3/MM3 (ref 3.4–10.8)

## 2020-07-08 PROCEDURE — 84460 ALANINE AMINO (ALT) (SGPT): CPT | Performed by: OBSTETRICS & GYNECOLOGY

## 2020-07-08 PROCEDURE — 84450 TRANSFERASE (AST) (SGOT): CPT | Performed by: OBSTETRICS & GYNECOLOGY

## 2020-07-08 PROCEDURE — 83615 LACTATE (LD) (LDH) ENZYME: CPT | Performed by: OBSTETRICS & GYNECOLOGY

## 2020-07-08 PROCEDURE — 86850 RBC ANTIBODY SCREEN: CPT | Performed by: OBSTETRICS & GYNECOLOGY

## 2020-07-08 PROCEDURE — 82962 GLUCOSE BLOOD TEST: CPT

## 2020-07-08 PROCEDURE — 82247 BILIRUBIN TOTAL: CPT | Performed by: OBSTETRICS & GYNECOLOGY

## 2020-07-08 PROCEDURE — 84550 ASSAY OF BLOOD/URIC ACID: CPT | Performed by: OBSTETRICS & GYNECOLOGY

## 2020-07-08 PROCEDURE — 59025 FETAL NON-STRESS TEST: CPT

## 2020-07-08 PROCEDURE — 85027 COMPLETE CBC AUTOMATED: CPT | Performed by: OBSTETRICS & GYNECOLOGY

## 2020-07-08 PROCEDURE — 82565 ASSAY OF CREATININE: CPT | Performed by: OBSTETRICS & GYNECOLOGY

## 2020-07-08 PROCEDURE — 86901 BLOOD TYPING SEROLOGIC RH(D): CPT | Performed by: OBSTETRICS & GYNECOLOGY

## 2020-07-08 PROCEDURE — 84075 ASSAY ALKALINE PHOSPHATASE: CPT | Performed by: OBSTETRICS & GYNECOLOGY

## 2020-07-08 PROCEDURE — 86900 BLOOD TYPING SEROLOGIC ABO: CPT | Performed by: OBSTETRICS & GYNECOLOGY

## 2020-07-08 RX ORDER — BUTORPHANOL TARTRATE 1 MG/ML
1 INJECTION, SOLUTION INTRAMUSCULAR; INTRAVENOUS
Status: DISCONTINUED | OUTPATIENT
Start: 2020-07-08 | End: 2020-07-09 | Stop reason: HOSPADM

## 2020-07-08 RX ORDER — LIDOCAINE HYDROCHLORIDE 10 MG/ML
5 INJECTION, SOLUTION EPIDURAL; INFILTRATION; INTRACAUDAL; PERINEURAL AS NEEDED
Status: DISCONTINUED | OUTPATIENT
Start: 2020-07-08 | End: 2020-07-09 | Stop reason: HOSPADM

## 2020-07-08 RX ORDER — FAMOTIDINE 20 MG/1
20 TABLET, FILM COATED ORAL EVERY 12 HOURS SCHEDULED
Status: DISCONTINUED | OUTPATIENT
Start: 2020-07-08 | End: 2020-07-09 | Stop reason: HOSPADM

## 2020-07-08 RX ORDER — MAGNESIUM CARB/ALUMINUM HYDROX 105-160MG
30 TABLET,CHEWABLE ORAL ONCE
Status: DISCONTINUED | OUTPATIENT
Start: 2020-07-08 | End: 2020-07-09 | Stop reason: HOSPADM

## 2020-07-08 RX ORDER — OXYTOCIN-SODIUM CHLORIDE 0.9% IV SOLN 30 UNIT/500ML 30-0.9/5 UT/ML-%
2-20 SOLUTION INTRAVENOUS
Status: DISCONTINUED | OUTPATIENT
Start: 2020-07-08 | End: 2020-07-09

## 2020-07-08 RX ORDER — SODIUM CHLORIDE 0.9 % (FLUSH) 0.9 %
3 SYRINGE (ML) INJECTION EVERY 12 HOURS SCHEDULED
Status: DISCONTINUED | OUTPATIENT
Start: 2020-07-08 | End: 2020-07-09 | Stop reason: HOSPADM

## 2020-07-08 RX ORDER — SODIUM CHLORIDE 0.9 % (FLUSH) 0.9 %
3-10 SYRINGE (ML) INJECTION AS NEEDED
Status: DISCONTINUED | OUTPATIENT
Start: 2020-07-08 | End: 2020-07-09 | Stop reason: HOSPADM

## 2020-07-08 RX ORDER — ONDANSETRON 2 MG/ML
4 INJECTION INTRAMUSCULAR; INTRAVENOUS EVERY 6 HOURS PRN
Status: DISCONTINUED | OUTPATIENT
Start: 2020-07-08 | End: 2020-07-09

## 2020-07-08 RX ORDER — ONDANSETRON 4 MG/1
4 TABLET, FILM COATED ORAL EVERY 6 HOURS PRN
Status: DISCONTINUED | OUTPATIENT
Start: 2020-07-08 | End: 2020-07-09

## 2020-07-08 RX ORDER — SODIUM CHLORIDE, SODIUM LACTATE, POTASSIUM CHLORIDE, CALCIUM CHLORIDE 600; 310; 30; 20 MG/100ML; MG/100ML; MG/100ML; MG/100ML
125 INJECTION, SOLUTION INTRAVENOUS CONTINUOUS
Status: DISCONTINUED | OUTPATIENT
Start: 2020-07-08 | End: 2020-07-09

## 2020-07-08 RX ORDER — FAMOTIDINE 10 MG/ML
20 INJECTION, SOLUTION INTRAVENOUS EVERY 12 HOURS SCHEDULED
Status: DISCONTINUED | OUTPATIENT
Start: 2020-07-08 | End: 2020-07-09 | Stop reason: HOSPADM

## 2020-07-08 RX ORDER — TERBUTALINE SULFATE 1 MG/ML
0.25 INJECTION, SOLUTION SUBCUTANEOUS AS NEEDED
Status: DISCONTINUED | OUTPATIENT
Start: 2020-07-08 | End: 2020-07-09 | Stop reason: HOSPADM

## 2020-07-08 RX ADMIN — SODIUM CHLORIDE, POTASSIUM CHLORIDE, SODIUM LACTATE AND CALCIUM CHLORIDE 125 ML/HR: 600; 310; 30; 20 INJECTION, SOLUTION INTRAVENOUS at 18:53

## 2020-07-09 ENCOUNTER — ANESTHESIA (OUTPATIENT)
Dept: LABOR AND DELIVERY | Facility: HOSPITAL | Age: 25
End: 2020-07-09

## 2020-07-09 ENCOUNTER — ANESTHESIA EVENT (OUTPATIENT)
Dept: LABOR AND DELIVERY | Facility: HOSPITAL | Age: 25
End: 2020-07-09

## 2020-07-09 LAB
AMPHET+METHAMPHET UR QL: NEGATIVE
AMPHETAMINES UR QL: NEGATIVE
BARBITURATES UR QL SCN: NEGATIVE
BENZODIAZ UR QL SCN: NEGATIVE
BUPRENORPHINE SERPL-MCNC: NEGATIVE NG/ML
CANNABINOIDS SERPL QL: NEGATIVE
COCAINE UR QL: NEGATIVE
GLUCOSE BLDC GLUCOMTR-MCNC: 67 MG/DL (ref 70–130)
GLUCOSE BLDC GLUCOMTR-MCNC: 86 MG/DL (ref 70–130)
GLUCOSE BLDC GLUCOMTR-MCNC: 89 MG/DL (ref 70–130)
METHADONE UR QL SCN: NEGATIVE
OPIATES UR QL: NEGATIVE
OXYCODONE UR QL SCN: NEGATIVE
PCP UR QL SCN: NEGATIVE
PROPOXYPH UR QL: NEGATIVE
TRICYCLICS UR QL SCN: NEGATIVE

## 2020-07-09 PROCEDURE — 80306 DRUG TEST PRSMV INSTRMNT: CPT | Performed by: OBSTETRICS & GYNECOLOGY

## 2020-07-09 PROCEDURE — 59025 FETAL NON-STRESS TEST: CPT

## 2020-07-09 PROCEDURE — 25010000002 FENTANYL CITRATE (PF) 100 MCG/2ML SOLUTION: Performed by: NURSE ANESTHETIST, CERTIFIED REGISTERED

## 2020-07-09 PROCEDURE — 25010000002 ROPIVACAINE PER 1 MG: Performed by: NURSE ANESTHETIST, CERTIFIED REGISTERED

## 2020-07-09 PROCEDURE — 51703 INSERT BLADDER CATH COMPLEX: CPT

## 2020-07-09 PROCEDURE — 25010000002 ONDANSETRON PER 1 MG: Performed by: ADVANCED PRACTICE MIDWIFE

## 2020-07-09 PROCEDURE — 82962 GLUCOSE BLOOD TEST: CPT

## 2020-07-09 PROCEDURE — C1755 CATHETER, INTRASPINAL: HCPCS | Performed by: ANESTHESIOLOGY

## 2020-07-09 PROCEDURE — C1755 CATHETER, INTRASPINAL: HCPCS

## 2020-07-09 PROCEDURE — 0UQMXZZ REPAIR VULVA, EXTERNAL APPROACH: ICD-10-PCS | Performed by: OBSTETRICS & GYNECOLOGY

## 2020-07-09 RX ORDER — LANOLIN
CREAM (ML) TOPICAL
Status: DISCONTINUED | OUTPATIENT
Start: 2020-07-09 | End: 2020-07-11 | Stop reason: HOSPADM

## 2020-07-09 RX ORDER — PROMETHAZINE HYDROCHLORIDE 12.5 MG/1
12.5 TABLET ORAL EVERY 4 HOURS PRN
Status: DISCONTINUED | OUTPATIENT
Start: 2020-07-09 | End: 2020-07-11 | Stop reason: HOSPADM

## 2020-07-09 RX ORDER — METHYLERGONOVINE MALEATE 0.2 MG/ML
200 INJECTION INTRAVENOUS ONCE AS NEEDED
Status: DISCONTINUED | OUTPATIENT
Start: 2020-07-09 | End: 2020-07-09 | Stop reason: HOSPADM

## 2020-07-09 RX ORDER — SIMETHICONE 80 MG
80 TABLET,CHEWABLE ORAL 4 TIMES DAILY PRN
Status: DISCONTINUED | OUTPATIENT
Start: 2020-07-09 | End: 2020-07-11 | Stop reason: HOSPADM

## 2020-07-09 RX ORDER — FENTANYL CITRATE 50 UG/ML
INJECTION, SOLUTION INTRAMUSCULAR; INTRAVENOUS AS NEEDED
Status: DISCONTINUED | OUTPATIENT
Start: 2020-07-09 | End: 2020-07-09 | Stop reason: SURG

## 2020-07-09 RX ORDER — PROMETHAZINE HYDROCHLORIDE 25 MG/ML
12.5 INJECTION, SOLUTION INTRAMUSCULAR; INTRAVENOUS EVERY 4 HOURS PRN
Status: DISCONTINUED | OUTPATIENT
Start: 2020-07-09 | End: 2020-07-09

## 2020-07-09 RX ORDER — IBUPROFEN 600 MG/1
600 TABLET ORAL EVERY 6 HOURS PRN
Status: DISCONTINUED | OUTPATIENT
Start: 2020-07-09 | End: 2020-07-09 | Stop reason: HOSPADM

## 2020-07-09 RX ORDER — DIPHENHYDRAMINE HYDROCHLORIDE 50 MG/ML
12.5 INJECTION INTRAMUSCULAR; INTRAVENOUS EVERY 8 HOURS PRN
Status: DISCONTINUED | OUTPATIENT
Start: 2020-07-09 | End: 2020-07-09 | Stop reason: HOSPADM

## 2020-07-09 RX ORDER — IBUPROFEN 600 MG/1
600 TABLET ORAL EVERY 6 HOURS PRN
Status: DISCONTINUED | OUTPATIENT
Start: 2020-07-09 | End: 2020-07-11 | Stop reason: HOSPADM

## 2020-07-09 RX ORDER — ONDANSETRON 2 MG/ML
4 INJECTION INTRAMUSCULAR; INTRAVENOUS EVERY 6 HOURS PRN
Status: DISCONTINUED | OUTPATIENT
Start: 2020-07-09 | End: 2020-07-09

## 2020-07-09 RX ORDER — ACETAMINOPHEN 325 MG/1
650 TABLET ORAL EVERY 4 HOURS PRN
Status: DISCONTINUED | OUTPATIENT
Start: 2020-07-09 | End: 2020-07-11 | Stop reason: HOSPADM

## 2020-07-09 RX ORDER — HYDROCORTISONE 25 MG/G
1 CREAM TOPICAL AS NEEDED
Status: DISCONTINUED | OUTPATIENT
Start: 2020-07-09 | End: 2020-07-11 | Stop reason: HOSPADM

## 2020-07-09 RX ORDER — ONDANSETRON 4 MG/1
4 TABLET, FILM COATED ORAL EVERY 6 HOURS PRN
Status: DISCONTINUED | OUTPATIENT
Start: 2020-07-09 | End: 2020-07-09 | Stop reason: HOSPADM

## 2020-07-09 RX ORDER — ONDANSETRON 2 MG/ML
4 INJECTION INTRAMUSCULAR; INTRAVENOUS EVERY 6 HOURS PRN
Status: DISCONTINUED | OUTPATIENT
Start: 2020-07-09 | End: 2020-07-09 | Stop reason: HOSPADM

## 2020-07-09 RX ORDER — ONDANSETRON 2 MG/ML
4 INJECTION INTRAMUSCULAR; INTRAVENOUS ONCE AS NEEDED
Status: DISCONTINUED | OUTPATIENT
Start: 2020-07-09 | End: 2020-07-09 | Stop reason: HOSPADM

## 2020-07-09 RX ORDER — DOCUSATE SODIUM 100 MG/1
100 CAPSULE, LIQUID FILLED ORAL 2 TIMES DAILY
Status: DISCONTINUED | OUTPATIENT
Start: 2020-07-09 | End: 2020-07-11 | Stop reason: HOSPADM

## 2020-07-09 RX ORDER — METOCLOPRAMIDE HYDROCHLORIDE 5 MG/ML
10 INJECTION INTRAMUSCULAR; INTRAVENOUS ONCE AS NEEDED
Status: DISCONTINUED | OUTPATIENT
Start: 2020-07-09 | End: 2020-07-09 | Stop reason: HOSPADM

## 2020-07-09 RX ORDER — ACETAMINOPHEN 325 MG/1
650 TABLET ORAL EVERY 4 HOURS PRN
Status: DISCONTINUED | OUTPATIENT
Start: 2020-07-09 | End: 2020-07-09 | Stop reason: HOSPADM

## 2020-07-09 RX ORDER — TRISODIUM CITRATE DIHYDRATE AND CITRIC ACID MONOHYDRATE 500; 334 MG/5ML; MG/5ML
30 SOLUTION ORAL ONCE
Status: DISCONTINUED | OUTPATIENT
Start: 2020-07-09 | End: 2020-07-09 | Stop reason: HOSPADM

## 2020-07-09 RX ORDER — CARBOPROST TROMETHAMINE 250 UG/ML
250 INJECTION, SOLUTION INTRAMUSCULAR AS NEEDED
Status: DISCONTINUED | OUTPATIENT
Start: 2020-07-09 | End: 2020-07-09 | Stop reason: HOSPADM

## 2020-07-09 RX ORDER — OXYTOCIN-SODIUM CHLORIDE 0.9% IV SOLN 30 UNIT/500ML 30-0.9/5 UT/ML-%
85 SOLUTION INTRAVENOUS ONCE
Status: DISCONTINUED | OUTPATIENT
Start: 2020-07-09 | End: 2020-07-09 | Stop reason: HOSPADM

## 2020-07-09 RX ORDER — BISACODYL 10 MG
10 SUPPOSITORY, RECTAL RECTAL DAILY PRN
Status: DISCONTINUED | OUTPATIENT
Start: 2020-07-10 | End: 2020-07-11 | Stop reason: HOSPADM

## 2020-07-09 RX ORDER — OXYCODONE AND ACETAMINOPHEN 10; 325 MG/1; MG/1
2 TABLET ORAL EVERY 4 HOURS PRN
Status: DISCONTINUED | OUTPATIENT
Start: 2020-07-09 | End: 2020-07-09 | Stop reason: HOSPADM

## 2020-07-09 RX ORDER — LIDOCAINE HYDROCHLORIDE AND EPINEPHRINE 15; 5 MG/ML; UG/ML
INJECTION, SOLUTION EPIDURAL AS NEEDED
Status: DISCONTINUED | OUTPATIENT
Start: 2020-07-09 | End: 2020-07-09 | Stop reason: SURG

## 2020-07-09 RX ORDER — EPHEDRINE SULFATE/0.9% NACL/PF 25 MG/5 ML
10 SYRINGE (ML) INTRAVENOUS
Status: DISCONTINUED | OUTPATIENT
Start: 2020-07-09 | End: 2020-07-09 | Stop reason: HOSPADM

## 2020-07-09 RX ORDER — SODIUM CHLORIDE 0.9 % (FLUSH) 0.9 %
1-10 SYRINGE (ML) INJECTION AS NEEDED
Status: DISCONTINUED | OUTPATIENT
Start: 2020-07-09 | End: 2020-07-09

## 2020-07-09 RX ORDER — FAMOTIDINE 10 MG/ML
20 INJECTION, SOLUTION INTRAVENOUS ONCE AS NEEDED
Status: DISCONTINUED | OUTPATIENT
Start: 2020-07-09 | End: 2020-07-09 | Stop reason: HOSPADM

## 2020-07-09 RX ORDER — ONDANSETRON 4 MG/1
4 TABLET, FILM COATED ORAL EVERY 6 HOURS PRN
Status: DISCONTINUED | OUTPATIENT
Start: 2020-07-09 | End: 2020-07-11 | Stop reason: HOSPADM

## 2020-07-09 RX ORDER — OXYTOCIN-SODIUM CHLORIDE 0.9% IV SOLN 30 UNIT/500ML 30-0.9/5 UT/ML-%
650 SOLUTION INTRAVENOUS ONCE
Status: COMPLETED | OUTPATIENT
Start: 2020-07-09 | End: 2020-07-09

## 2020-07-09 RX ORDER — MISOPROSTOL 200 UG/1
800 TABLET ORAL AS NEEDED
Status: DISCONTINUED | OUTPATIENT
Start: 2020-07-09 | End: 2020-07-09 | Stop reason: HOSPADM

## 2020-07-09 RX ADMIN — FENTANYL CITRATE 100 MCG: 50 INJECTION, SOLUTION INTRAMUSCULAR; INTRAVENOUS at 09:02

## 2020-07-09 RX ADMIN — SODIUM CHLORIDE, POTASSIUM CHLORIDE, SODIUM LACTATE AND CALCIUM CHLORIDE 1000 ML: 600; 310; 30; 20 INJECTION, SOLUTION INTRAVENOUS at 08:39

## 2020-07-09 RX ADMIN — WITCH HAZEL 1 PAD: 500 SOLUTION RECTAL; TOPICAL at 17:04

## 2020-07-09 RX ADMIN — LIDOCAINE HYDROCHLORIDE AND EPINEPHRINE 3 ML: 15; 5 INJECTION, SOLUTION EPIDURAL at 08:59

## 2020-07-09 RX ADMIN — IBUPROFEN 600 MG: 600 TABLET, FILM COATED ORAL at 17:04

## 2020-07-09 RX ADMIN — SODIUM CHLORIDE, POTASSIUM CHLORIDE, SODIUM LACTATE AND CALCIUM CHLORIDE 125 ML/HR: 600; 310; 30; 20 INJECTION, SOLUTION INTRAVENOUS at 09:28

## 2020-07-09 RX ADMIN — OXYTOCIN 2 MILLI-UNITS/MIN: 10 INJECTION INTRAVENOUS at 02:20

## 2020-07-09 RX ADMIN — OXYTOCIN 650 ML/HR: 10 INJECTION INTRAVENOUS at 13:49

## 2020-07-09 RX ADMIN — Medication: at 17:04

## 2020-07-09 RX ADMIN — LIDOCAINE HYDROCHLORIDE AND EPINEPHRINE 2 ML: 15; 5 INJECTION, SOLUTION EPIDURAL at 09:02

## 2020-07-09 RX ADMIN — SODIUM CHLORIDE, POTASSIUM CHLORIDE, SODIUM LACTATE AND CALCIUM CHLORIDE 125 ML/HR: 600; 310; 30; 20 INJECTION, SOLUTION INTRAVENOUS at 00:57

## 2020-07-09 RX ADMIN — ROPIVACAINE HYDROCHLORIDE 10 ML: 5 INJECTION, SOLUTION EPIDURAL; INFILTRATION; PERINEURAL at 09:06

## 2020-07-09 RX ADMIN — ONDANSETRON 4 MG: 2 INJECTION INTRAMUSCULAR; INTRAVENOUS at 07:13

## 2020-07-09 RX ADMIN — SODIUM CHLORIDE, POTASSIUM CHLORIDE, SODIUM LACTATE AND CALCIUM CHLORIDE 1000 ML: 600; 310; 30; 20 INJECTION, SOLUTION INTRAVENOUS at 08:12

## 2020-07-09 RX ADMIN — Medication 15 ML/HR: at 09:07

## 2020-07-09 RX ADMIN — DOCUSATE SODIUM 100 MG: 100 CAPSULE, LIQUID FILLED ORAL at 20:40

## 2020-07-09 NOTE — H&P
[facility]      Winifred Ireland      : 1995  MRN: [id number]  CSN: [id number]  Date of Service:  2020    CHIEF COMPLAINT:   Chief Complaint   Patient presents with   • Scheduled Induction     GESTATIONAL AGE:  39w1d    HISTORY OF PRESENT ILLNESS:    Patient is a 24 y.o. female  currently at 39w1d, who presents for induction of labor with A1DM.       REVIEW OF SYSTEMS:  Reports fetal movement is normal  Headache:denies   Epigastric: denies   Visual Changes: denies   Amniotic Fluid: Denies leakage of amniotic fluid.  Presence of Vaginal Bleeding Assessed: Denies vaginal bleeding  Patient Reports: Some cramping  All other systems reviewed and are negative      MATERNAL PRENATAL LABS:  Blood Type ABO Type   Date Value Ref Range Status   2020 A  Final      Rh Status RH type   Date Value Ref Range Status   2020 Positive  Final      Antibody Screen Antibody Screen   Date Value Ref Range Status   2020 Negative  Final      Gonnorhea No results found for: GCCX   Chlamydia No results found for: CLAMYDCU   RPR No results found for: RPR   Syphilis Antibody No results found for: SYPHILIS   Rubella No results found for: RUBELLAIGGIN   Hepatitis B Surface Antigen No results found for: HEPBSAG   HIV-1 Antibody No results found for: LABHIV1   Hepatitis C Antibody No results found for: HEPCAB   Rapid Urin Drug Screen No results found for: AMPMETHU, BARBITSCNUR, LABBENZSCN, LABMETHSCN, LABOPIASCN, THCURSCR, COCAINEUR, AMPHETSCREEN, PROPOXSCN, BUPRENORSCNU, METAMPSCNUR, OXYCODONESCN, TRICYCLICSCN   Group B Strep Culture No results found for: GBSANTIGEN                   OB History    Para Term  AB Living   2 0 0 0 1 0   SAB TAB Ectopic Molar Multiple Live Births   1 0 0 0 0 0      # Outcome Date GA Lbr Aditya/2nd Weight Sex Delivery Anes PTL Lv   2 Current            1 SAB 2018 10w0d    SAB   FD         The following portions of the patients history were reviewed and updated as  appropriate:   current medications, allergies, past medical history, past surgical history, past family history, past social history and problem list    Past Medical History: Past Medical History:   Diagnosis Date   • Anxiety     situational- states she had some chest pain earliet today   • Chlamydia 2019    treeated   • Depression     no meds   • Gestational diabetes    • Hypertension affecting pregnancy     since 28 weeks      Past Surgical History Past Surgical History:   Procedure Laterality Date   • D&C WITH SUCTION  2018    sab      Family History: Family History   Problem Relation Age of Onset   • Hypertension Father    • Dementia Father    • No Known Problems Mother    • No Known Problems Brother       Social History:  reports that she has never smoked. She has never used smokeless tobacco.   reports that she drank alcohol.   reports that she does not use drugs.             Objective        Min/max vitals past 24 hours:   Temp  Min: 97.7 °F (36.5 °C)  Max: 98.1 °F (36.7 °C)  BP  Min: 116/75  Max: 134/85  Pulse  Min: 83  Max: 97  Resp  Min: 16  Max: 18   Weight:  [93.4 kg (206 lb)] 93.4 kg (206 lb)   FHR 120s, reactive NST, category 1.        PHYSICAL EXAM:  General appearance - alert and in no distress  Mental status - alert, oriented to person, place, and time, normal mood, behavior, speech, dress, motor activity, affect appropriate   Chest - clear to auscultation, no wheezes, rales or rhonchi, symmetric air entry  Heart- normal rate, regular rhythm,  no murmurs, rubs, or gallops  Abdomen - soft, nontender, , no CVA tenderness, no hernias noted  Pelvic- normal external genitalia, cervix:, 5/80/-1, vertex, AROM clear  Neurological-DTR's normal and symmetric without clonus  Extremities-no pedal edema noted, Donna's sign negative bilaterally, no redness or tenderness in the calves or thighs  Skin-normal coloration and turgor, no rashes, no suspicious skin lesions noted                                         CBC   WBC   Date Value Ref Range Status   07/08/2020 8.95 3.40 - 10.80 10*3/mm3 Final     RBC   Date Value Ref Range Status   07/08/2020 3.78 3.77 - 5.28 10*6/mm3 Final     Hemoglobin   Date Value Ref Range Status   07/08/2020 10.7 (L) 12.0 - 15.9 g/dL Final     Hematocrit   Date Value Ref Range Status   07/08/2020 34.0 34.0 - 46.6 % Final     MCV   Date Value Ref Range Status   07/08/2020 89.9 79.0 - 97.0 fL Final     MCH   Date Value Ref Range Status   07/08/2020 28.3 26.6 - 33.0 pg Final     MCHC   Date Value Ref Range Status   07/08/2020 31.5 31.5 - 35.7 g/dL Final     RDW   Date Value Ref Range Status   07/08/2020 13.4 12.3 - 15.4 % Final     RDW-SD   Date Value Ref Range Status   07/08/2020 43.7 37.0 - 54.0 fl Final     MPV   Date Value Ref Range Status   07/08/2020 10.4 6.0 - 12.0 fL Final     Platelets   Date Value Ref Range Status   07/08/2020 166 140 - 450 10*3/mm3 Final         Preeclampsia Panel Lab Result  Lab Results   Component Value Date    ALKPHOS 161 (H) 07/08/2020    ALT 15 07/08/2020    AST 20 07/08/2020    CREATININE 0.41 (L) 07/08/2020    BILITOT <0.2 07/08/2020     (H) 07/08/2020    URICACID 4.2 07/08/2020               HOSPITAL LABS:  Lab Results   Component Value Date    WBC 8.95 07/08/2020    HGB 10.7 (L) 07/08/2020    HCT 34.0 07/08/2020    MCV 89.9 07/08/2020     07/08/2020    URICACID 4.2 07/08/2020    AST 20 07/08/2020    ALT 15 07/08/2020     (H) 07/08/2020     Results from last 7 days   Lab Units 07/08/20  1846   ABO TYPING  A   RH TYPING  Positive   ANTIBODY SCREEN  Negative               Assessment:  1.  Intrauterine pregnancy at 39w1d weeks gestation with reactive fetal status.    2.   induction of labor  for A1DM  with favorable cervix  3.  Obstetrical history significant for is non-contributory.  4.  GBS status: No results found for: STREPGPB    Plan:  1.Oxytocin induction  2. Plan of care has been reviewed with patient and questions  answered.  3.  Risks, benefits of treatment plan have been discussed.  4.  All questions have been answered.        Conchita John MD  08:54  07/09/20

## 2020-07-09 NOTE — LACTATION NOTE
Baby charlie nursed for 5 minutes on left breast in football hold with xs shield.     07/09/20 8833   Maternal Information   Date of Referral 07/09/20   Person Making Referral other (see comments)  (courtesy)   Maternal Reason for Referral breastfeeding currently   Maternal Assessment   Breast Size Issue none   Breast Shape Bilateral:;round   Breast Density Bilateral:;soft   Nipples Bilateral:;flat   Maternal Infant Feeding   Maternal Emotional State assist needed   Infant Positioning clutch/football   Pain with Feeding no   Comfort Measures Before/During Feeding infant position adjusted;latch adjusted;maternal position adjusted   Latch Assistance yes   Equipment Type   Breast Pump Type double electric, personal   Reproductive Interventions   Breast Care: Breastfeeding frequency of feeding adjusted;nipple shield utilized   Breastfeeding Assistance assisted with positioning;support offered;feeding cue recognition promoted;feeding on demand promoted;infant latch-on verified;infant stimulated to wakeful state;nipple shield utilized   Breastfeeding Support lactation counseling provided   Coping/Psychosocial Interventions   Parent/Child Attachment Promotion cue recognition promoted;face-to-face positioning promoted;skin-to-skin contact encouraged;rooming-in promoted   Supportive Measures counseling provided

## 2020-07-09 NOTE — ANESTHESIA PROCEDURE NOTES
Labor Epidural      Patient reassessed immediately prior to procedure    Patient location during procedure: floor  Performed By  Anesthesiologist: Alfred Lora DO  CRNA: Analilia Boles CRNA  Preanesthetic Checklist  Completed: patient identified, surgical consent, pre-op evaluation, timeout performed, IV checked, risks and benefits discussed and monitors and equipment checked  Prep:  Pt Position:sitting  Sterile Tech:cap, gloves, mask and sterile barrier  Prep:DuraPrep  Monitoring:blood pressure monitoring  Epidural Block Procedure:  Approach:midline  Guidance:landmark technique and palpation technique  Location:L4-L5  Needle Type:Tuohy  Needle Gauge:17 G  Loss of Resistance Medium: air  Loss of Resistance: 5cm  Cath Depth at skin:10 cm  Paresthesia: none  Aspiration:negative  Test Dose:negative  Number of Attempts: 1  Post Assessment:  Dressing:occlusive dressing applied and secured with tape  Pt Tolerance:patient tolerated the procedure well with no apparent complications  Complications:no

## 2020-07-09 NOTE — L&D DELIVERY NOTE
[facility]    Vaginal Delivery Note    [facility]      Patient name:  Winifred Ireland  : 1995  MRN: [id number]  CSN: [id number]  Date of Service:  20    Chief Complaint   Patient presents with   • Scheduled Induction           Patient Active Problem List   Diagnosis   • Decreased fetal movement   • HTN (hypertension)   • Diet controlled gestational diabetes mellitus (GDM)   • Pregnancy   • Pregnancy with 39 completed weeks gestation     Current Gestational Age:  39w1d    Delivery details     Delivery:    YOB: 2019    Time of Birth: 1:01 PM      Anesthesia: Epidural     Delivering clinician: Conchita John    Forceps?   No   Vacuum? No    Shoulder dystocia present: No      Delivery narrative:  Patient delivered a vigorous, VFI over a irregular right vaginal and vulvar laceration by HUNTER Castle CNM prior to my arrival.  Baby on maternal abdomen.  I clamped and assisted FOB with cord cutting.  Cord blood and cord segment obtained.  Placenta delivered spontanously, intact with 3 vessel cord.  Laceration repaired with 2-0 and 3-0 vicryl.  There was good uterine tone and good hemostasis of the repair. Apgars pending at the time of this document creation.  mL    Infant details    Findings: female  infant     Infant observations: Weight: 3050 g (6 lb 11.6 oz)   Length: 19  in   Apgars:    @ 1 minute /       @ 5 minutes           Disposition  Mother to Mother Baby/Postpartum  in stable condition currently.  Baby to remains with mom  in stable condition currently.      Conchita John MD  20  13:19

## 2020-07-09 NOTE — PLAN OF CARE
Cont to increase pitocin, FSBS q 4, will get an epidural when requested. Pt coping well with labor at this time.

## 2020-07-09 NOTE — ANESTHESIA PREPROCEDURE EVALUATION
Anesthesia Evaluation     Patient summary reviewed and Nursing notes reviewed   NPO Solid Status: > 6 hours  NPO Liquid Status: < 2 hours           Airway   Mallampati: II  TM distance: >3 FB  Neck ROM: full  Dental      Pulmonary - negative pulmonary ROS   Cardiovascular     (+) hypertension,       Neuro/Psych- negative ROS  GI/Hepatic/Renal/Endo    (+)   diabetes mellitus gestational,     Musculoskeletal (-) negative ROS    Abdominal    Substance History - negative use     OB/GYN    (+) Pregnant,         Other - negative ROS                       Anesthesia Plan    ASA 3     epidural       Anesthetic plan, all risks, benefits, and alternatives have been provided, discussed and informed consent has been obtained with: patient.

## 2020-07-10 LAB
BASOPHILS # BLD AUTO: 0.02 10*3/MM3 (ref 0–0.2)
BASOPHILS NFR BLD AUTO: 0.2 % (ref 0–1.5)
DEPRECATED RDW RBC AUTO: 42.9 FL (ref 37–54)
EOSINOPHIL # BLD AUTO: 0.15 10*3/MM3 (ref 0–0.4)
EOSINOPHIL NFR BLD AUTO: 1.3 % (ref 0.3–6.2)
ERYTHROCYTE [DISTWIDTH] IN BLOOD BY AUTOMATED COUNT: 13.4 % (ref 12.3–15.4)
GLUCOSE BLDC GLUCOMTR-MCNC: 79 MG/DL (ref 70–130)
HCT VFR BLD AUTO: 34.1 % (ref 34–46.6)
HGB BLD-MCNC: 10.8 G/DL (ref 12–15.9)
IMM GRANULOCYTES # BLD AUTO: 0.08 10*3/MM3 (ref 0–0.05)
IMM GRANULOCYTES NFR BLD AUTO: 0.7 % (ref 0–0.5)
LYMPHOCYTES # BLD AUTO: 1.74 10*3/MM3 (ref 0.7–3.1)
LYMPHOCYTES NFR BLD AUTO: 15.6 % (ref 19.6–45.3)
MCH RBC QN AUTO: 28 PG (ref 26.6–33)
MCHC RBC AUTO-ENTMCNC: 31.7 G/DL (ref 31.5–35.7)
MCV RBC AUTO: 88.3 FL (ref 79–97)
MONOCYTES # BLD AUTO: 0.61 10*3/MM3 (ref 0.1–0.9)
MONOCYTES NFR BLD AUTO: 5.5 % (ref 5–12)
NEUTROPHILS NFR BLD AUTO: 76.7 % (ref 42.7–76)
NEUTROPHILS NFR BLD AUTO: 8.54 10*3/MM3 (ref 1.7–7)
NRBC BLD AUTO-RTO: 0 /100 WBC (ref 0–0.2)
PLATELET # BLD AUTO: 162 10*3/MM3 (ref 140–450)
PMV BLD AUTO: 10.2 FL (ref 6–12)
RBC # BLD AUTO: 3.86 10*6/MM3 (ref 3.77–5.28)
WBC # BLD AUTO: 11.14 10*3/MM3 (ref 3.4–10.8)

## 2020-07-10 PROCEDURE — 82962 GLUCOSE BLOOD TEST: CPT

## 2020-07-10 PROCEDURE — 85025 COMPLETE CBC W/AUTO DIFF WBC: CPT | Performed by: OBSTETRICS & GYNECOLOGY

## 2020-07-10 RX ORDER — FERROUS SULFATE 325(65) MG
325 TABLET ORAL
Status: DISCONTINUED | OUTPATIENT
Start: 2020-07-10 | End: 2020-07-11 | Stop reason: HOSPADM

## 2020-07-10 RX ADMIN — FERROUS SULFATE TAB 325 MG (65 MG ELEMENTAL FE) 325 MG: 325 (65 FE) TAB at 09:50

## 2020-07-10 RX ADMIN — DOCUSATE SODIUM 100 MG: 100 CAPSULE, LIQUID FILLED ORAL at 20:29

## 2020-07-10 RX ADMIN — SIMETHICONE 80 MG: 80 TABLET, CHEWABLE ORAL at 20:29

## 2020-07-10 RX ADMIN — DOCUSATE SODIUM 100 MG: 100 CAPSULE, LIQUID FILLED ORAL at 07:51

## 2020-07-10 RX ADMIN — IBUPROFEN 600 MG: 600 TABLET, FILM COATED ORAL at 07:51

## 2020-07-10 RX ADMIN — IBUPROFEN 600 MG: 600 TABLET, FILM COATED ORAL at 20:30

## 2020-07-10 NOTE — ANESTHESIA POSTPROCEDURE EVALUATION
Patient: Winifred Ireland    Procedure Summary     Date:  07/09/20 Room / Location:      Anesthesia Start:  0848 Anesthesia Stop:  1306    Procedure:  LABOR ANALGESIA Diagnosis:      Scheduled Providers:   Provider:  Alfred Lora DO    Anesthesia Type:  epidural ASA Status:  3          Anesthesia Type: epidural    Vitals  Vitals Value Taken Time   /77 7/10/2020  7:55 AM   Temp 98.2 °F (36.8 °C) 7/10/2020  7:55 AM   Pulse 87 7/10/2020  7:55 AM   Resp 16 7/10/2020  7:55 AM   SpO2             Post Anesthesia Care and Evaluation    Patient location during evaluation: bedside  Patient participation: complete - patient participated  Level of consciousness: awake and alert  Pain management: adequate  Airway patency: patent  Anesthetic complications: No anesthetic complications    Cardiovascular status: acceptable  Respiratory status: acceptable  Hydration status: acceptable  Post Neuraxial Block status: Motor and sensory function returned to baseline and No signs or symptoms of PDPH

## 2020-07-10 NOTE — PROGRESS NOTES
7/10/2020  PPD #1    Subjective   Winifred feels well.  Patient describes her lochia as less than menses.  Pain is well controlled  She is breastfeeding     Objective   Temp: Temp:  [97.6 °F (36.4 °C)-98.4 °F (36.9 °C)] 97.9 °F (36.6 °C) Temp src: Oral   BP: BP: ()/(51-94) 123/76        Pulse: Heart Rate:  [] 78  RR: Resp:  [16-20] 16    General:  No acute distress   Abdomen: Fundus firm and beneath umbilicus   Pelvis: deferred     Lab Results   Component Value Date    WBC 11.14 (H) 07/10/2020    HGB 10.8 (L) 07/10/2020    HCT 34.1 07/10/2020    MCV 88.3 07/10/2020     07/10/2020    URICACID 4.2 07/08/2020    AST 20 07/08/2020    ALT 15 07/08/2020     (H) 07/08/2020    HEPBSAG Non-Reactive 02/26/2020     Results from last 7 days   Lab Units 07/08/20  1846   ABO TYPING  A   RH TYPING  Positive   ANTIBODY SCREEN  Negative       Assessment  1. PPD# 1 after vaginal delivery  2.   Mild PP anemia  Plan  1. Supportive care, anticipate d/c in am.  2. Continue prenatal vitamin and ferrous sulfate daily      This note has been electronically signed.    Radha Oglesby CNM  08:50  July 10, 2020

## 2020-07-11 VITALS
DIASTOLIC BLOOD PRESSURE: 78 MMHG | SYSTOLIC BLOOD PRESSURE: 121 MMHG | TEMPERATURE: 98.2 F | HEIGHT: 63 IN | BODY MASS INDEX: 36.5 KG/M2 | HEART RATE: 89 BPM | WEIGHT: 206 LBS | RESPIRATION RATE: 16 BRPM

## 2020-07-11 PROBLEM — O36.8190 DECREASED FETAL MOVEMENT: Status: RESOLVED | Noted: 2020-04-25 | Resolved: 2020-07-11

## 2020-07-11 PROBLEM — Z34.90 PREGNANCY: Status: RESOLVED | Noted: 2020-06-25 | Resolved: 2020-07-11

## 2020-07-11 PROBLEM — O24.410 DIET CONTROLLED GESTATIONAL DIABETES MELLITUS (GDM): Status: RESOLVED | Noted: 2020-05-23 | Resolved: 2020-07-11

## 2020-07-11 RX ADMIN — DOCUSATE SODIUM 100 MG: 100 CAPSULE, LIQUID FILLED ORAL at 08:04

## 2020-07-11 RX ADMIN — FERROUS SULFATE TAB 325 MG (65 MG ELEMENTAL FE) 325 MG: 325 (65 FE) TAB at 08:04

## 2020-07-11 NOTE — DISCHARGE INSTR - APPOINTMENTS
Please call Monday for follow up appointment in 6 weeks with Radha Oglesby.  Remember to ask about measles mump rubella shot.

## 2020-07-11 NOTE — DISCHARGE SUMMARY
[facility]  Vaginal Delivery Discharge Summary      Patient: Winifred Ireland      MR#:[id number]  Admission  Diagnosis: Term Pregnancy  Discharge Diagnosis: Vaginal Delivery    Date of Admission: 2020  Date of Discharge:  2020    Procedures:  , Spontaneous     2020    1:01 PM      Service:  Obstetrics    Hospital Course:  Patient underwent vaginal delivery and remained in the hospital for 2 days.  During that time she remained afebrile and hemodynamically stable.  On the day of discharge, she was eating, ambulating and voiding without difficulty.      Lab Results   Component Value Date    WBC 11.14 (H) 07/10/2020    HGB 10.8 (L) 07/10/2020    HCT 34.1 07/10/2020    MCV 88.3 07/10/2020     07/10/2020    URICACID 4.2 2020    AST 20 2020    ALT 15 2020     (H) 2020     Results from last 7 days   Lab Units 20  1846   ABO TYPING  A   RH TYPING  Positive   ANTIBODY SCREEN  Negative       Discharge Medications     Discharge Medications      Continue These Medications      Instructions Start Date   Accu-Chek Sharon Plus w/Device kit   TEST BLOOD GLUCOSE 8 TIMES DAILY      Accu-Chek Softclix Lancets lancets   TEST BLOOD GLUCOSE 8 TIMES DAILY      glucose monitor monitoring kit   Use to monitor BG up to 8 times daily      Lancets Misc. kit   Use to monitor BG up to 8 times daily      Prenatal 27-1 27-1 MG tablet tablet   1 tablet, Oral, Daily             Discharge Disposition:  To Home    Discharge Condition:  Stable    Discharge Diet: regular    Activity at Discharge: Pelvic rest    Follow-up Appointments  Future Appointments   Date Time Provider Department Center   2020 10:45 AM Isadora Kasper DO [facility] None   6 weeks with Mendel Blanchard CNM  20  10:04

## 2020-07-11 NOTE — PROGRESS NOTES
[facility]  Vaginal Delivery Progress Note    Subjective     Doing well, pain controlled, lochia less than menses      Objective     Vital Signs Range for the last 24 hours  Temperature: Temp:  [98.2 °F (36.8 °C)-98.4 °F (36.9 °C)] 98.2 °F (36.8 °C)   Temp Source: Temp src: Oral   BP: BP: (121-129)/(69-78) 121/78   Pulse: Heart Rate:  [71-93] 89   Respirations: Resp:  [16-18] 16   SPO2:     O2 Amount (l/min):     O2 Devices           Physical Exam:  General:  no acute distresss.  Abdomen: Soft, non-tender, fundus firm  Extremities: normal, atraumatic, no cyanosis, and trace edema.       Lab results reviewed:  Yes    Lab Results   Component Value Date    WBC 11.14 (H) 07/10/2020    HGB 10.8 (L) 07/10/2020    HCT 34.1 07/10/2020    MCV 88.3 07/10/2020     07/10/2020         Assessment/Plan       Pregnancy with 39 completed weeks gestation      Winifred Ireland is Day 2  post-partum       Plan:  Discharge home with standard precautions and return to clinic in 4-6 weeks.      Jazmin Blanchard CNM  7/11/2020  10:01

## 2023-09-22 ENCOUNTER — LAB (OUTPATIENT)
Dept: LAB | Facility: HOSPITAL | Age: 28
End: 2023-09-22
Payer: COMMERCIAL

## 2023-09-22 ENCOUNTER — TRANSCRIBE ORDERS (OUTPATIENT)
Dept: LAB | Facility: HOSPITAL | Age: 28
End: 2023-09-22
Payer: COMMERCIAL

## 2023-09-22 DIAGNOSIS — N92.6 IRREGULAR MENSTRUAL CYCLE: ICD-10-CM

## 2023-09-22 DIAGNOSIS — N92.6 IRREGULAR MENSTRUAL CYCLE: Primary | ICD-10-CM

## 2023-09-22 PROCEDURE — 84702 CHORIONIC GONADOTROPIN TEST: CPT

## 2023-09-22 PROCEDURE — 36415 COLL VENOUS BLD VENIPUNCTURE: CPT

## 2023-09-23 LAB — HCG INTACT+B SERPL-ACNC: NORMAL MIU/ML

## 2023-11-02 ENCOUNTER — LAB (OUTPATIENT)
Dept: LAB | Facility: HOSPITAL | Age: 28
End: 2023-11-02
Payer: COMMERCIAL

## 2023-11-02 ENCOUNTER — TRANSCRIBE ORDERS (OUTPATIENT)
Dept: LAB | Facility: HOSPITAL | Age: 28
End: 2023-11-02
Payer: COMMERCIAL

## 2023-11-02 DIAGNOSIS — Z34.01 ENCOUNTER FOR SUPERVISION OF NORMAL FIRST PREGNANCY IN FIRST TRIMESTER: ICD-10-CM

## 2023-11-02 DIAGNOSIS — Z34.81 PRENATAL CARE, SUBSEQUENT PREGNANCY, FIRST TRIMESTER: ICD-10-CM

## 2023-11-02 DIAGNOSIS — Z34.81 PRENATAL CARE, SUBSEQUENT PREGNANCY, FIRST TRIMESTER: Primary | ICD-10-CM

## 2023-11-02 DIAGNOSIS — Z3A.12 12 WEEKS GESTATION OF PREGNANCY: ICD-10-CM

## 2023-11-02 LAB
ABO GROUP BLD: NORMAL
ALBUMIN SERPL-MCNC: 4.2 G/DL (ref 3.5–5.2)
ALBUMIN/GLOB SERPL: 1.2 G/DL
ALP SERPL-CCNC: 89 U/L (ref 39–117)
ALT SERPL W P-5'-P-CCNC: 14 U/L (ref 1–33)
AMPHET+METHAMPHET UR QL: NEGATIVE
AMPHETAMINES UR QL: NEGATIVE
ANION GAP SERPL CALCULATED.3IONS-SCNC: 11 MMOL/L (ref 5–15)
AST SERPL-CCNC: 14 U/L (ref 1–32)
BARBITURATES UR QL SCN: NEGATIVE
BENZODIAZ UR QL SCN: NEGATIVE
BILIRUB SERPL-MCNC: <0.2 MG/DL (ref 0–1.2)
BLD GP AB SCN SERPL QL: NEGATIVE
BUN SERPL-MCNC: 8 MG/DL (ref 6–20)
BUN/CREAT SERPL: 14.3 (ref 7–25)
BUPRENORPHINE SERPL-MCNC: NEGATIVE NG/ML
CALCIUM SPEC-SCNC: 9.1 MG/DL (ref 8.6–10.5)
CANNABINOIDS SERPL QL: NEGATIVE
CHLORIDE SERPL-SCNC: 102 MMOL/L (ref 98–107)
CO2 SERPL-SCNC: 23 MMOL/L (ref 22–29)
COCAINE UR QL: NEGATIVE
CREAT SERPL-MCNC: 0.56 MG/DL (ref 0.57–1)
DEPRECATED RDW RBC AUTO: 44.2 FL (ref 37–54)
EGFRCR SERPLBLD CKD-EPI 2021: 128.5 ML/MIN/1.73
ERYTHROCYTE [DISTWIDTH] IN BLOOD BY AUTOMATED COUNT: 14.7 % (ref 12.3–15.4)
FENTANYL UR-MCNC: NEGATIVE NG/ML
GLOBULIN UR ELPH-MCNC: 3.4 GM/DL
GLUCOSE SERPL-MCNC: 76 MG/DL (ref 65–99)
HBA1C MFR BLD: 5.4 % (ref 4.8–5.6)
HBV SURFACE AG SERPL QL IA: NORMAL
HCT VFR BLD AUTO: 39.4 % (ref 34–46.6)
HCV AB SER DONR QL: NORMAL
HGB BLD-MCNC: 13.3 G/DL (ref 12–15.9)
HIV 1+2 AB+HIV1 P24 AG SERPL QL IA: NORMAL
LDH SERPL-CCNC: 197 U/L (ref 135–214)
MCH RBC QN AUTO: 28.1 PG (ref 26.6–33)
MCHC RBC AUTO-ENTMCNC: 33.8 G/DL (ref 31.5–35.7)
MCV RBC AUTO: 83.1 FL (ref 79–97)
METHADONE UR QL SCN: NEGATIVE
OPIATES UR QL: NEGATIVE
OXYCODONE UR QL SCN: NEGATIVE
PCP UR QL SCN: NEGATIVE
PLATELET # BLD AUTO: 222 10*3/MM3 (ref 140–450)
PMV BLD AUTO: 10.5 FL (ref 6–12)
POTASSIUM SERPL-SCNC: 3.6 MMOL/L (ref 3.5–5.2)
PROPOXYPH UR QL: NEGATIVE
PROT SERPL-MCNC: 7.6 G/DL (ref 6–8.5)
RBC # BLD AUTO: 4.74 10*6/MM3 (ref 3.77–5.28)
RH BLD: POSITIVE
SODIUM SERPL-SCNC: 136 MMOL/L (ref 136–145)
TRICYCLICS UR QL SCN: NEGATIVE
URATE SERPL-MCNC: 3.3 MG/DL (ref 2.4–5.7)
WBC NRBC COR # BLD: 6.45 10*3/MM3 (ref 3.4–10.8)

## 2023-11-02 PROCEDURE — 86850 RBC ANTIBODY SCREEN: CPT

## 2023-11-02 PROCEDURE — 80307 DRUG TEST PRSMV CHEM ANLYZR: CPT

## 2023-11-02 PROCEDURE — 84550 ASSAY OF BLOOD/URIC ACID: CPT

## 2023-11-02 PROCEDURE — 87086 URINE CULTURE/COLONY COUNT: CPT

## 2023-11-02 PROCEDURE — 81001 URINALYSIS AUTO W/SCOPE: CPT

## 2023-11-02 PROCEDURE — 83036 HEMOGLOBIN GLYCOSYLATED A1C: CPT

## 2023-11-02 PROCEDURE — 86762 RUBELLA ANTIBODY: CPT

## 2023-11-02 PROCEDURE — 84156 ASSAY OF PROTEIN URINE: CPT

## 2023-11-02 PROCEDURE — 36415 COLL VENOUS BLD VENIPUNCTURE: CPT

## 2023-11-02 PROCEDURE — 87340 HEPATITIS B SURFACE AG IA: CPT

## 2023-11-02 PROCEDURE — 86803 HEPATITIS C AB TEST: CPT

## 2023-11-02 PROCEDURE — 86780 TREPONEMA PALLIDUM: CPT

## 2023-11-02 PROCEDURE — 86900 BLOOD TYPING SEROLOGIC ABO: CPT

## 2023-11-02 PROCEDURE — G0432 EIA HIV-1/HIV-2 SCREEN: HCPCS

## 2023-11-02 PROCEDURE — 80053 COMPREHEN METABOLIC PANEL: CPT

## 2023-11-02 PROCEDURE — 83615 LACTATE (LD) (LDH) ENZYME: CPT

## 2023-11-02 PROCEDURE — 85027 COMPLETE CBC AUTOMATED: CPT

## 2023-11-02 PROCEDURE — 86901 BLOOD TYPING SEROLOGIC RH(D): CPT

## 2023-11-03 LAB
BACTERIA SPEC AEROBE CULT: NORMAL
BACTERIA UR QL AUTO: ABNORMAL /HPF
BILIRUB UR QL STRIP: NEGATIVE
CLARITY UR: ABNORMAL
COLOR UR: YELLOW
GLUCOSE UR STRIP-MCNC: NEGATIVE MG/DL
HGB UR QL STRIP.AUTO: NEGATIVE
HOLD SPECIMEN: NORMAL
HYALINE CASTS UR QL AUTO: ABNORMAL /LPF
KETONES UR QL STRIP: NEGATIVE
LEUKOCYTE ESTERASE UR QL STRIP.AUTO: ABNORMAL
NITRITE UR QL STRIP: NEGATIVE
PH UR STRIP.AUTO: 6.5 [PH] (ref 5–8)
PROT ?TM UR-MCNC: 10.1 MG/DL
PROT UR QL STRIP: NEGATIVE
RBC # UR STRIP: ABNORMAL /HPF
REF LAB TEST METHOD: ABNORMAL
RUBV IGG SERPL IA-ACNC: 3.7 INDEX
SP GR UR STRIP: 1.02 (ref 1–1.03)
SQUAMOUS #/AREA URNS HPF: ABNORMAL /HPF
TREPONEMA PALLIDUM IGG+IGM AB [PRESENCE] IN SERUM OR PLASMA BY IMMUNOASSAY: NON REACTIVE
UROBILINOGEN UR QL STRIP: ABNORMAL
WBC # UR STRIP: ABNORMAL /HPF

## 2023-11-06 LAB — REF LAB TEST RESULTS: NORMAL

## 2024-02-22 ENCOUNTER — LAB (OUTPATIENT)
Dept: LAB | Facility: HOSPITAL | Age: 29
End: 2024-02-22
Payer: COMMERCIAL

## 2024-02-22 ENCOUNTER — TRANSCRIBE ORDERS (OUTPATIENT)
Dept: LAB | Facility: HOSPITAL | Age: 29
End: 2024-02-22
Payer: COMMERCIAL

## 2024-02-22 DIAGNOSIS — Z3A.25 25 WEEKS GESTATION OF PREGNANCY: ICD-10-CM

## 2024-02-22 DIAGNOSIS — Z34.82 PRENATAL CARE, SUBSEQUENT PREGNANCY, SECOND TRIMESTER: ICD-10-CM

## 2024-02-22 DIAGNOSIS — Z34.82 PRENATAL CARE, SUBSEQUENT PREGNANCY, SECOND TRIMESTER: Primary | ICD-10-CM

## 2024-02-22 LAB
BLD GP AB SCN SERPL QL: NEGATIVE
DEPRECATED RDW RBC AUTO: 40.2 FL (ref 37–54)
ERYTHROCYTE [DISTWIDTH] IN BLOOD BY AUTOMATED COUNT: 13.2 % (ref 12.3–15.4)
GLUCOSE 1H P 100 G GLC PO SERPL-MCNC: 128 MG/DL (ref 65–139)
HCT VFR BLD AUTO: 33.6 % (ref 34–46.6)
HGB BLD-MCNC: 11 G/DL (ref 12–15.9)
MCH RBC QN AUTO: 27.3 PG (ref 26.6–33)
MCHC RBC AUTO-ENTMCNC: 32.7 G/DL (ref 31.5–35.7)
MCV RBC AUTO: 83.4 FL (ref 79–97)
PLATELET # BLD AUTO: 241 10*3/MM3 (ref 140–450)
PMV BLD AUTO: 9.9 FL (ref 6–12)
RBC # BLD AUTO: 4.03 10*6/MM3 (ref 3.77–5.28)
WBC NRBC COR # BLD AUTO: 7.47 10*3/MM3 (ref 3.4–10.8)

## 2024-02-22 PROCEDURE — 86850 RBC ANTIBODY SCREEN: CPT

## 2024-02-22 PROCEDURE — 36415 COLL VENOUS BLD VENIPUNCTURE: CPT

## 2024-02-22 PROCEDURE — 86780 TREPONEMA PALLIDUM: CPT

## 2024-02-22 PROCEDURE — 82948 REAGENT STRIP/BLOOD GLUCOSE: CPT | Performed by: ADVANCED PRACTICE MIDWIFE

## 2024-02-22 PROCEDURE — 82950 GLUCOSE TEST: CPT

## 2024-02-22 PROCEDURE — 85027 COMPLETE CBC AUTOMATED: CPT

## 2024-02-23 LAB — TREPONEMA PALLIDUM IGG+IGM AB [PRESENCE] IN SERUM OR PLASMA BY IMMUNOASSAY: NON REACTIVE

## 2024-04-20 ENCOUNTER — ANESTHESIA EVENT (OUTPATIENT)
Dept: LABOR AND DELIVERY | Facility: HOSPITAL | Age: 29
End: 2024-04-20
Payer: COMMERCIAL

## 2024-04-20 ENCOUNTER — HOSPITAL ENCOUNTER (INPATIENT)
Facility: HOSPITAL | Age: 29
LOS: 2 days | Discharge: HOME | End: 2024-04-22
Attending: OBSTETRICS & GYNECOLOGY | Admitting: STUDENT IN AN ORGANIZED HEALTH CARE EDUCATION/TRAINING PROGRAM
Payer: COMMERCIAL

## 2024-04-20 ENCOUNTER — ANESTHESIA (OUTPATIENT)
Dept: LABOR AND DELIVERY | Facility: HOSPITAL | Age: 29
End: 2024-04-20
Payer: COMMERCIAL

## 2024-04-20 LAB
ABO GROUP BLD: NORMAL
ALBUMIN SERPL-MCNC: 3.5 G/DL (ref 3.5–5.2)
ALBUMIN/GLOB SERPL: 0.9 G/DL
ALP SERPL-CCNC: 200 U/L (ref 39–117)
ALT SERPL W P-5'-P-CCNC: 12 U/L (ref 1–33)
ANION GAP SERPL CALCULATED.3IONS-SCNC: 12 MMOL/L (ref 5–15)
AST SERPL-CCNC: 18 U/L (ref 1–32)
BILIRUB SERPL-MCNC: 0.2 MG/DL (ref 0–1.2)
BLD GP AB SCN SERPL QL: NEGATIVE
BUN SERPL-MCNC: 14 MG/DL (ref 6–20)
BUN/CREAT SERPL: 30.4 (ref 7–25)
CALCIUM SPEC-SCNC: 8.5 MG/DL (ref 8.6–10.5)
CHLORIDE SERPL-SCNC: 101 MMOL/L (ref 98–107)
CO2 SERPL-SCNC: 21 MMOL/L (ref 22–29)
CREAT SERPL-MCNC: 0.46 MG/DL (ref 0.57–1)
DEPRECATED RDW RBC AUTO: 38.7 FL (ref 37–54)
EGFRCR SERPLBLD CKD-EPI 2021: 134.7 ML/MIN/1.73
ERYTHROCYTE [DISTWIDTH] IN BLOOD BY AUTOMATED COUNT: 13.7 % (ref 12.3–15.4)
GLOBULIN UR ELPH-MCNC: 3.8 GM/DL
GLUCOSE SERPL-MCNC: 84 MG/DL (ref 65–99)
HCT VFR BLD AUTO: 32.9 % (ref 34–46.6)
HGB BLD-MCNC: 10.5 G/DL (ref 12–15.9)
LDH SERPL-CCNC: 307 U/L (ref 135–214)
MCH RBC QN AUTO: 25.1 PG (ref 26.6–33)
MCHC RBC AUTO-ENTMCNC: 31.9 G/DL (ref 31.5–35.7)
MCV RBC AUTO: 78.7 FL (ref 79–97)
PLATELET # BLD AUTO: 217 10*3/MM3 (ref 140–450)
PMV BLD AUTO: 10 FL (ref 6–12)
POTASSIUM SERPL-SCNC: 3.7 MMOL/L (ref 3.5–5.2)
PROT SERPL-MCNC: 7.3 G/DL (ref 6–8.5)
RBC # BLD AUTO: 4.18 10*6/MM3 (ref 3.77–5.28)
RH BLD: POSITIVE
SODIUM SERPL-SCNC: 134 MMOL/L (ref 136–145)
T PALLIDUM IGG SER QL: NORMAL
T&S EXPIRATION DATE: NORMAL
URATE SERPL-MCNC: 4.9 MG/DL (ref 2.4–5.7)
WBC NRBC COR # BLD AUTO: 12.4 10*3/MM3 (ref 3.4–10.8)

## 2024-04-20 PROCEDURE — 86780 TREPONEMA PALLIDUM: CPT | Performed by: OBSTETRICS & GYNECOLOGY

## 2024-04-20 PROCEDURE — C1755 CATHETER, INTRASPINAL: HCPCS | Performed by: ANESTHESIOLOGY

## 2024-04-20 PROCEDURE — 59025 FETAL NON-STRESS TEST: CPT

## 2024-04-20 PROCEDURE — 25010000002 ROPIVACAINE PER 1 MG: Performed by: ANESTHESIOLOGY

## 2024-04-20 PROCEDURE — 25010000002 PENICILLIN G POTASSIUM PER 600000 UNITS: Performed by: OBSTETRICS & GYNECOLOGY

## 2024-04-20 PROCEDURE — 25010000002 FENTANYL CITRATE (PF) 50 MCG/ML SOLUTION: Performed by: ANESTHESIOLOGY

## 2024-04-20 PROCEDURE — 83615 LACTATE (LD) (LDH) ENZYME: CPT | Performed by: OBSTETRICS & GYNECOLOGY

## 2024-04-20 PROCEDURE — 86850 RBC ANTIBODY SCREEN: CPT | Performed by: OBSTETRICS & GYNECOLOGY

## 2024-04-20 PROCEDURE — 86901 BLOOD TYPING SEROLOGIC RH(D): CPT | Performed by: OBSTETRICS & GYNECOLOGY

## 2024-04-20 PROCEDURE — 25810000003 LACTATED RINGERS PER 1000 ML: Performed by: OBSTETRICS & GYNECOLOGY

## 2024-04-20 PROCEDURE — C1755 CATHETER, INTRASPINAL: HCPCS

## 2024-04-20 PROCEDURE — 86900 BLOOD TYPING SEROLOGIC ABO: CPT | Performed by: OBSTETRICS & GYNECOLOGY

## 2024-04-20 PROCEDURE — 10907ZC DRAINAGE OF AMNIOTIC FLUID, THERAPEUTIC FROM PRODUCTS OF CONCEPTION, VIA NATURAL OR ARTIFICIAL OPENING: ICD-10-PCS | Performed by: STUDENT IN AN ORGANIZED HEALTH CARE EDUCATION/TRAINING PROGRAM

## 2024-04-20 PROCEDURE — 84550 ASSAY OF BLOOD/URIC ACID: CPT | Performed by: OBSTETRICS & GYNECOLOGY

## 2024-04-20 PROCEDURE — 85027 COMPLETE CBC AUTOMATED: CPT | Performed by: OBSTETRICS & GYNECOLOGY

## 2024-04-20 PROCEDURE — 80053 COMPREHEN METABOLIC PANEL: CPT | Performed by: OBSTETRICS & GYNECOLOGY

## 2024-04-20 PROCEDURE — 25810000003 LACTATED RINGERS SOLUTION: Performed by: OBSTETRICS & GYNECOLOGY

## 2024-04-20 RX ORDER — EPHEDRINE SULFATE 5 MG/ML
10 INJECTION INTRAVENOUS
Status: DISCONTINUED | OUTPATIENT
Start: 2024-04-20 | End: 2024-04-20 | Stop reason: HOSPADM

## 2024-04-20 RX ORDER — ROPIVACAINE HYDROCHLORIDE 2 MG/ML
15 INJECTION, SOLUTION EPIDURAL; INFILTRATION; PERINEURAL CONTINUOUS
Status: DISCONTINUED | OUTPATIENT
Start: 2024-04-20 | End: 2024-04-22 | Stop reason: HOSPADM

## 2024-04-20 RX ORDER — DIPHENHYDRAMINE HYDROCHLORIDE 50 MG/ML
12.5 INJECTION INTRAMUSCULAR; INTRAVENOUS EVERY 8 HOURS PRN
Status: DISCONTINUED | OUTPATIENT
Start: 2024-04-20 | End: 2024-04-20 | Stop reason: HOSPADM

## 2024-04-20 RX ORDER — SIMETHICONE 80 MG
80 TABLET,CHEWABLE ORAL 4 TIMES DAILY PRN
Status: DISCONTINUED | OUTPATIENT
Start: 2024-04-20 | End: 2024-04-22 | Stop reason: HOSPADM

## 2024-04-20 RX ORDER — METHYLERGONOVINE MALEATE 0.2 MG/ML
200 INJECTION INTRAVENOUS ONCE AS NEEDED
Status: DISCONTINUED | OUTPATIENT
Start: 2024-04-20 | End: 2024-04-22 | Stop reason: HOSPADM

## 2024-04-20 RX ORDER — CARBOPROST TROMETHAMINE 250 UG/ML
250 INJECTION, SOLUTION INTRAMUSCULAR AS NEEDED
Status: DISCONTINUED | OUTPATIENT
Start: 2024-04-20 | End: 2024-04-22 | Stop reason: HOSPADM

## 2024-04-20 RX ORDER — CALCIUM CARBONATE 500 MG/1
1 TABLET, CHEWABLE ORAL 3 TIMES DAILY PRN
Status: DISCONTINUED | OUTPATIENT
Start: 2024-04-20 | End: 2024-04-22 | Stop reason: HOSPADM

## 2024-04-20 RX ORDER — OXYTOCIN/0.9 % SODIUM CHLORIDE 30/500 ML
250 PLASTIC BAG, INJECTION (ML) INTRAVENOUS CONTINUOUS
Status: DISPENSED | OUTPATIENT
Start: 2024-04-20 | End: 2024-04-20

## 2024-04-20 RX ORDER — LIDOCAINE HYDROCHLORIDE AND EPINEPHRINE 15; 5 MG/ML; UG/ML
INJECTION, SOLUTION EPIDURAL AS NEEDED
Status: DISCONTINUED | OUTPATIENT
Start: 2024-04-20 | End: 2024-04-20 | Stop reason: SURG

## 2024-04-20 RX ORDER — HYDROCODONE BITARTRATE AND ACETAMINOPHEN 10; 325 MG/1; MG/1
1 TABLET ORAL EVERY 4 HOURS PRN
Status: DISCONTINUED | OUTPATIENT
Start: 2024-04-20 | End: 2024-04-22 | Stop reason: HOSPADM

## 2024-04-20 RX ORDER — METOCLOPRAMIDE HYDROCHLORIDE 5 MG/ML
10 INJECTION INTRAMUSCULAR; INTRAVENOUS ONCE AS NEEDED
Status: DISCONTINUED | OUTPATIENT
Start: 2024-04-20 | End: 2024-04-20 | Stop reason: HOSPADM

## 2024-04-20 RX ORDER — CITRIC ACID/SODIUM CITRATE 334-500MG
30 SOLUTION, ORAL ORAL ONCE
Status: DISCONTINUED | OUTPATIENT
Start: 2024-04-20 | End: 2024-04-20 | Stop reason: HOSPADM

## 2024-04-20 RX ORDER — ONDANSETRON 4 MG/1
4 TABLET, ORALLY DISINTEGRATING ORAL EVERY 8 HOURS PRN
Status: DISCONTINUED | OUTPATIENT
Start: 2024-04-20 | End: 2024-04-22 | Stop reason: HOSPADM

## 2024-04-20 RX ORDER — FAMOTIDINE 10 MG/ML
20 INJECTION, SOLUTION INTRAVENOUS ONCE AS NEEDED
Status: DISCONTINUED | OUTPATIENT
Start: 2024-04-20 | End: 2024-04-20 | Stop reason: HOSPADM

## 2024-04-20 RX ORDER — LIDOCAINE HYDROCHLORIDE 10 MG/ML
0.5 INJECTION, SOLUTION EPIDURAL; INFILTRATION; INTRACAUDAL; PERINEURAL ONCE AS NEEDED
Status: DISCONTINUED | OUTPATIENT
Start: 2024-04-20 | End: 2024-04-20 | Stop reason: HOSPADM

## 2024-04-20 RX ORDER — HYDROCORTISONE 25 MG/G
1 CREAM TOPICAL AS NEEDED
Status: DISCONTINUED | OUTPATIENT
Start: 2024-04-20 | End: 2024-04-22 | Stop reason: HOSPADM

## 2024-04-20 RX ORDER — DOCUSATE SODIUM 100 MG/1
100 CAPSULE, LIQUID FILLED ORAL 2 TIMES DAILY
Status: DISCONTINUED | OUTPATIENT
Start: 2024-04-20 | End: 2024-04-22 | Stop reason: HOSPADM

## 2024-04-20 RX ORDER — ONDANSETRON 2 MG/ML
4 INJECTION INTRAMUSCULAR; INTRAVENOUS ONCE AS NEEDED
Status: DISCONTINUED | OUTPATIENT
Start: 2024-04-20 | End: 2024-04-20 | Stop reason: HOSPADM

## 2024-04-20 RX ORDER — PRENATAL VIT/IRON FUM/FOLIC AC 27MG-0.8MG
1 TABLET ORAL DAILY
Status: DISCONTINUED | OUTPATIENT
Start: 2024-04-20 | End: 2024-04-22 | Stop reason: HOSPADM

## 2024-04-20 RX ORDER — ONDANSETRON 2 MG/ML
4 INJECTION INTRAMUSCULAR; INTRAVENOUS EVERY 6 HOURS PRN
Status: DISCONTINUED | OUTPATIENT
Start: 2024-04-20 | End: 2024-04-22 | Stop reason: HOSPADM

## 2024-04-20 RX ORDER — ONDANSETRON 2 MG/ML
4 INJECTION INTRAMUSCULAR; INTRAVENOUS EVERY 6 HOURS PRN
Status: DISCONTINUED | OUTPATIENT
Start: 2024-04-20 | End: 2024-04-20 | Stop reason: HOSPADM

## 2024-04-20 RX ORDER — ROPIVACAINE HYDROCHLORIDE 5 MG/ML
INJECTION, SOLUTION EPIDURAL; INFILTRATION; PERINEURAL AS NEEDED
Status: DISCONTINUED | OUTPATIENT
Start: 2024-04-20 | End: 2024-04-20 | Stop reason: SURG

## 2024-04-20 RX ORDER — DIPHENHYDRAMINE HCL 25 MG
25 CAPSULE ORAL NIGHTLY PRN
Status: DISCONTINUED | OUTPATIENT
Start: 2024-04-20 | End: 2024-04-22 | Stop reason: HOSPADM

## 2024-04-20 RX ORDER — MAGNESIUM CARB/ALUMINUM HYDROX 105-160MG
30 TABLET,CHEWABLE ORAL ONCE
Status: DISCONTINUED | OUTPATIENT
Start: 2024-04-20 | End: 2024-04-20 | Stop reason: HOSPADM

## 2024-04-20 RX ORDER — SODIUM CHLORIDE 0.9 % (FLUSH) 0.9 %
1-10 SYRINGE (ML) INJECTION AS NEEDED
Status: DISCONTINUED | OUTPATIENT
Start: 2024-04-20 | End: 2024-04-22 | Stop reason: HOSPADM

## 2024-04-20 RX ORDER — HYDROCODONE BITARTRATE AND ACETAMINOPHEN 5; 325 MG/1; MG/1
1 TABLET ORAL EVERY 4 HOURS PRN
Status: DISCONTINUED | OUTPATIENT
Start: 2024-04-20 | End: 2024-04-22 | Stop reason: HOSPADM

## 2024-04-20 RX ORDER — OXYTOCIN/0.9 % SODIUM CHLORIDE 30/500 ML
PLASTIC BAG, INJECTION (ML) INTRAVENOUS
Status: COMPLETED
Start: 2024-04-20 | End: 2024-04-20

## 2024-04-20 RX ORDER — OXYTOCIN/0.9 % SODIUM CHLORIDE 30/500 ML
125 PLASTIC BAG, INJECTION (ML) INTRAVENOUS CONTINUOUS PRN
Status: DISCONTINUED | OUTPATIENT
Start: 2024-04-20 | End: 2024-04-22 | Stop reason: HOSPADM

## 2024-04-20 RX ORDER — SODIUM CHLORIDE, SODIUM LACTATE, POTASSIUM CHLORIDE, CALCIUM CHLORIDE 600; 310; 30; 20 MG/100ML; MG/100ML; MG/100ML; MG/100ML
125 INJECTION, SOLUTION INTRAVENOUS CONTINUOUS
Status: DISCONTINUED | OUTPATIENT
Start: 2024-04-20 | End: 2024-04-22 | Stop reason: HOSPADM

## 2024-04-20 RX ORDER — SODIUM CHLORIDE 0.9 % (FLUSH) 0.9 %
10 SYRINGE (ML) INJECTION AS NEEDED
Status: DISCONTINUED | OUTPATIENT
Start: 2024-04-20 | End: 2024-04-20 | Stop reason: HOSPADM

## 2024-04-20 RX ORDER — SODIUM CHLORIDE 0.9 % (FLUSH) 0.9 %
10 SYRINGE (ML) INJECTION EVERY 12 HOURS SCHEDULED
Status: DISCONTINUED | OUTPATIENT
Start: 2024-04-20 | End: 2024-04-20 | Stop reason: HOSPADM

## 2024-04-20 RX ORDER — SODIUM CHLORIDE 9 MG/ML
40 INJECTION, SOLUTION INTRAVENOUS AS NEEDED
Status: DISCONTINUED | OUTPATIENT
Start: 2024-04-20 | End: 2024-04-20 | Stop reason: HOSPADM

## 2024-04-20 RX ORDER — OXYTOCIN/0.9 % SODIUM CHLORIDE 30/500 ML
999 PLASTIC BAG, INJECTION (ML) INTRAVENOUS ONCE
Status: DISCONTINUED | OUTPATIENT
Start: 2024-04-20 | End: 2024-04-22 | Stop reason: HOSPADM

## 2024-04-20 RX ORDER — ONDANSETRON 4 MG/1
4 TABLET, ORALLY DISINTEGRATING ORAL EVERY 6 HOURS PRN
Status: DISCONTINUED | OUTPATIENT
Start: 2024-04-20 | End: 2024-04-20 | Stop reason: HOSPADM

## 2024-04-20 RX ORDER — MISOPROSTOL 200 UG/1
800 TABLET ORAL AS NEEDED
Status: DISCONTINUED | OUTPATIENT
Start: 2024-04-20 | End: 2024-04-20 | Stop reason: HOSPADM

## 2024-04-20 RX ORDER — CARBOPROST TROMETHAMINE 250 UG/ML
250 INJECTION, SOLUTION INTRAMUSCULAR AS NEEDED
Status: DISCONTINUED | OUTPATIENT
Start: 2024-04-20 | End: 2024-04-20 | Stop reason: HOSPADM

## 2024-04-20 RX ORDER — FENTANYL CITRATE 50 UG/ML
INJECTION, SOLUTION INTRAMUSCULAR; INTRAVENOUS AS NEEDED
Status: DISCONTINUED | OUTPATIENT
Start: 2024-04-20 | End: 2024-04-20 | Stop reason: SURG

## 2024-04-20 RX ORDER — IBUPROFEN 600 MG/1
600 TABLET ORAL EVERY 6 HOURS PRN
Status: DISCONTINUED | OUTPATIENT
Start: 2024-04-20 | End: 2024-04-22 | Stop reason: HOSPADM

## 2024-04-20 RX ORDER — PROMETHAZINE HYDROCHLORIDE 12.5 MG/1
12.5 TABLET ORAL EVERY 4 HOURS PRN
Status: DISCONTINUED | OUTPATIENT
Start: 2024-04-20 | End: 2024-04-22 | Stop reason: HOSPADM

## 2024-04-20 RX ORDER — ACETAMINOPHEN 325 MG/1
650 TABLET ORAL EVERY 4 HOURS PRN
Status: DISCONTINUED | OUTPATIENT
Start: 2024-04-20 | End: 2024-04-20 | Stop reason: HOSPADM

## 2024-04-20 RX ORDER — ACETAMINOPHEN 325 MG/1
650 TABLET ORAL EVERY 6 HOURS PRN
Status: DISCONTINUED | OUTPATIENT
Start: 2024-04-20 | End: 2024-04-22 | Stop reason: HOSPADM

## 2024-04-20 RX ORDER — EPHEDRINE SULFATE 5 MG/ML
INJECTION INTRAVENOUS
Status: DISCONTINUED
Start: 2024-04-20 | End: 2024-04-22 | Stop reason: HOSPADM

## 2024-04-20 RX ORDER — METHYLERGONOVINE MALEATE 0.2 MG/ML
200 INJECTION INTRAVENOUS ONCE AS NEEDED
Status: DISCONTINUED | OUTPATIENT
Start: 2024-04-20 | End: 2024-04-20 | Stop reason: HOSPADM

## 2024-04-20 RX ORDER — PENICILLIN G 3000000 [IU]/50ML
3 INJECTION, SOLUTION INTRAVENOUS EVERY 4 HOURS
Status: DISCONTINUED | OUTPATIENT
Start: 2024-04-20 | End: 2024-04-20 | Stop reason: HOSPADM

## 2024-04-20 RX ORDER — OXYTOCIN/0.9 % SODIUM CHLORIDE 30/500 ML
2-20 PLASTIC BAG, INJECTION (ML) INTRAVENOUS
Status: DISCONTINUED | OUTPATIENT
Start: 2024-04-20 | End: 2024-04-22 | Stop reason: HOSPADM

## 2024-04-20 RX ORDER — MISOPROSTOL 200 UG/1
800 TABLET ORAL AS NEEDED
Status: DISCONTINUED | OUTPATIENT
Start: 2024-04-20 | End: 2024-04-22 | Stop reason: HOSPADM

## 2024-04-20 RX ADMIN — FENTANYL CITRATE 100 MCG: 50 INJECTION, SOLUTION INTRAMUSCULAR; INTRAVENOUS at 10:17

## 2024-04-20 RX ADMIN — LIDOCAINE HYDROCHLORIDE AND EPINEPHRINE 3 ML: 15; 5 INJECTION, SOLUTION EPIDURAL at 10:11

## 2024-04-20 RX ADMIN — ROPIVACAINE HYDROCHLORIDE 15 ML/HR: 2 INJECTION, SOLUTION EPIDURAL; INFILTRATION at 10:20

## 2024-04-20 RX ADMIN — SODIUM CHLORIDE 5 MILLION UNITS: 900 INJECTION INTRAVENOUS at 06:54

## 2024-04-20 RX ADMIN — PENICILLIN G 3 MILLION UNITS: 3000000 INJECTION, SOLUTION INTRAVENOUS at 10:13

## 2024-04-20 RX ADMIN — Medication: at 18:21

## 2024-04-20 RX ADMIN — SODIUM CHLORIDE, POTASSIUM CHLORIDE, SODIUM LACTATE AND CALCIUM CHLORIDE 125 ML/HR: 600; 310; 30; 20 INJECTION, SOLUTION INTRAVENOUS at 04:31

## 2024-04-20 RX ADMIN — SODIUM CHLORIDE, POTASSIUM CHLORIDE, SODIUM LACTATE AND CALCIUM CHLORIDE 1000 ML: 600; 310; 30; 20 INJECTION, SOLUTION INTRAVENOUS at 09:52

## 2024-04-20 RX ADMIN — ROPIVACAINE HYDROCHLORIDE 6 ML: 5 INJECTION, SOLUTION EPIDURAL; INFILTRATION; PERINEURAL at 10:17

## 2024-04-20 RX ADMIN — SODIUM CHLORIDE, POTASSIUM CHLORIDE, SODIUM LACTATE AND CALCIUM CHLORIDE 125 ML/HR: 600; 310; 30; 20 INJECTION, SOLUTION INTRAVENOUS at 10:16

## 2024-04-20 RX ADMIN — WITCH HAZEL: 500 SOLUTION RECTAL; TOPICAL at 18:21

## 2024-04-20 RX ADMIN — Medication 2 MILLI-UNITS/MIN: at 12:29

## 2024-04-20 RX ADMIN — LIDOCAINE HYDROCHLORIDE AND EPINEPHRINE 2 ML: 15; 5 INJECTION, SOLUTION EPIDURAL at 10:14

## 2024-04-20 RX ADMIN — DOCUSATE SODIUM 100 MG: 100 CAPSULE, LIQUID FILLED ORAL at 20:53

## 2024-04-20 NOTE — PROGRESS NOTES
"24  14:01 EDT  Winifred Ireland    SUBJECTIVE: comfortable     ASSESSMENTS:     /87   Pulse 95   Temp 98.2 °F (36.8 °C) (Axillary)   Resp 16   Ht 160 cm (63\")   Wt 96.6 kg (213 lb)   Breastfeeding No   BMI 37.73 kg/m²     Fetal Heart Rate Assessment   Method: Fetal HR Assessment Method: external, fetal spiral electrode (Poor tracing)   Beats/min: Fetal HR (beats/min): 120   Baseline: Fetal HR Baseline: normal range   Varibility: Fetal HR Variability: moderate (amplitude range 6 to 25 bpm)   Accels: Fetal HR Accelerations: absent   Decels: Fetal HR Decelerations: variable   Tracing Category:       Uterine Assessment   Method: Method:  (Poor tracing)   Frequency (min): Contraction Frequency (Minutes): 2-3   Ctx Count in 10 min:     Duration:     Intensity: Contraction Intensity: no contractions   Intensity by IUPC:     Resting Tone: Uterine Resting Tone: soft by palpation   Resting Tone by IUPC:       Presentation: cephalic   Cervix: Exam by: Method: sterile exam per RN   Dilation: 6-7   Effacement: 90   Station: -1            IUP at 36w4d    labor          PLAN:  -SVE /-1  -Bulging bag of fluid  -AROM performed with amnihook with return of clear fluid  -FHT Cat I  -Will start IV PPP to shorten intervals between contractions  -Proceed toward vaginal delivery    Jayda Rao DO  14:01 EDT  24      "

## 2024-04-20 NOTE — ANESTHESIA PROCEDURE NOTES
Labor Epidural      Patient reassessed immediately prior to procedure    Patient location during procedure: OB  Performed By  Anesthesiologist: Rola Tsai DO  Preanesthetic Checklist  Completed: patient identified, IV checked, risks and benefits discussed, surgical consent, monitors and equipment checked, pre-op evaluation and timeout performed  Additional Notes  CSE performed using 25g María Elena  Prep:  Pt Position:sitting  Sterile Tech:cap, gloves, mask and sterile barrier  Prep:chlorhexidine gluconate and isopropyl alcohol  Monitoring:blood pressure monitoring  Epidural Block Procedure:  Approach:midline  Guidance:palpation technique  Location:L3-L4  Needle Type:Tuohy  Needle Gauge:17 G  Loss of Resistance Medium: air  Loss of Resistance: 5cm  Cath Depth at skin:12 cm  Paresthesia: none  Aspiration:negative  Test Dose:negative  Number of Attempts: 1  Post Assessment:  Dressing:occlusive dressing applied and secured with tape  Pt Tolerance:patient tolerated the procedure well with no apparent complications  Complications:no

## 2024-04-20 NOTE — ANESTHESIA PREPROCEDURE EVALUATION
Anesthesia Evaluation     Patient summary reviewed and Nursing notes reviewed                Airway   Mallampati: II  TM distance: >3 FB  Neck ROM: full  No difficulty expected  Dental - normal exam     Pulmonary - negative pulmonary ROS   Cardiovascular - negative cardio ROS        Neuro/Psych  (+) psychiatric history Anxiety and Depression  GI/Hepatic/Renal/Endo    (+) obesity, morbid obesity    Musculoskeletal (-) negative ROS    Abdominal    Substance History - negative use     OB/GYN    (+) Pregnant        Other - negative ROS                   Anesthesia Plan    ASA 2     epidural       Anesthetic plan, risks, benefits, and alternatives have been provided, discussed and informed consent has been obtained with: patient.  Pre-procedure education provided    CODE STATUS:    Level Of Support Discussed With: Patient  Code Status (Patient has no pulse and is not breathing): CPR (Attempt to Resuscitate)  Medical Interventions (Patient has pulse or is breathing): Full Support

## 2024-04-20 NOTE — H&P
[facility]  Obstetric History and Physical    Chief Complaint   Patient presents with    Contractions       Subjective     Patient is a 28 y.o. female  currently at 36w4d, who presents for  labor  without ROM. On intake reports regular uterine contractions, denies  leakage of fluid, denies  vaginal bleeding. reports fetal movement.    Her prenatal care is complicated by history of gestational hypertension and history of GDM. This pregnancy has been uncomplicated.     The following portions of the patients history were reviewed and updated as appropriate: current medications, allergies, past medical history, past surgical history, past family history, past social history, and problem list .       Prenatal Information:  Prenatal Results       Initial Prenatal Labs       Test Value Reference Range Date Time    Hemoglobin  13.3 g/dL 12.0 - 15.9 23 1459    Hematocrit  39.4 % 34.0 - 46.6 23 1459    Platelets  222 10*3/mm3 140 - 450 23 1459    Rubella IgG  3.70 index Immune >0.99 23 1459    Hepatitis B SAg  Non-Reactive  Non-Reactive 23 1459    Hepatitis C Ab  Non-Reactive  Non-Reactive 23 1459    RPR  Non-Reactive  Non-Reactive 20 1215    T. Pallidum Ab   Non-Reactive  Non-Reactive 24 0430    ABO  A   24 0430    Rh  Positive   24 0430    Antibody Screen  Negative   23 1459    HIV  Non-Reactive  Non-Reactive 23 1459    Urine Culture  50,000 CFU/mL Mixed Ade Isolated   23 1459    Gonorrhea        Chlamydia        TSH        HgB A1c   5.40 % 4.80 - 5.60 23 1459    Varicella IgG        HgB Electrophoresis         Cystic fibrosis                   Fetal testing        Test Value Reference Range Date Time    NIPT        MSAFP        AFP-4                  2nd and 3rd Trimester       Test Value Reference Range Date Time    Hemoglobin (repeated)  10.5 g/dL 12.0 - 15.9 24 0430       11.0 g/dL 12.0 - 15.9 24 1135     Hematocrit (repeated)  32.9 % 34.0 - 46.6 24 0430       33.6 % 34.0 - 46.6 24 1135    Platelets   217 10*3/mm3 140 - 450 24 0430       241 10*3/mm3 140 - 450 24 1135       222 10*3/mm3 140 - 450 23 1459    GCT  128 mg/dL 65 - 139 24 1135    Antibody Screen (repeated)  Negative   24 0430       Negative   24 1135    3rd TM syphilis scrn (repeated)  RPR         3rd TM syphilis scrn (repeated) FTA        GTT Fasting        GTT 1 Hr        GTT 2 Hr        GTT 3 Hr        Group B Strep                  Other testing        Test Value Reference Range Date Time    Parvo IgG         CMV IgG                   Drug Screening       Test Value Reference Range Date Time    Amphetamine Screen  Negative  Negative 23 1459    Barbiturate Screen  Negative  Negative 23 1459    Benzodiazepine Screen  Negative  Negative 23 1459    Methadone Screen  Negative  Negative 23 1459    Phencyclidine Screen  Negative  Negative 23 1459    Opiates Screen  Negative  Negative 23 1459    THC Screen  Negative  Negative 23 1459    Cocaine Screen  Negative  Negative 23 1459    Propoxyphene Screen  Negative  Negative 23 1459    Buprenorphine Screen  Negative  Negative 23 1459    Methamphetamine Screen  Negative  Negative 23 1459    Oxycodone Screen  Negative  Negative 23 1459    Tricyclic Antidepressants Screen  Negative  Negative 23 1459              Legend    ^: Historical                               Past OB History:       OB History    Para Term  AB Living   3 1 1 0 1 1    IAB Ectopic Molar Multiple Live Births   1 0 0 0 0 1      # Outcome Date GA Lbr Aditya/2nd Weight Sex Type Anes PTL Lv   3 Current            2 Term 20 39w1d  3050 g (6 lb 11.6 oz) F Vag-Spont EPI N CATIE      Name: EDGARD CHANEY      Apgar1: 8  Apgar5: 9   1 SAB 2018 10w0d    SAB   FD       Past Medical History: Past Medical  History:   Diagnosis Date    Anxiety 2013    situational- states she had some chest pain earliet today    Chlamydia 11/2019    treeated    Depression     no meds    Gestational diabetes 2020    Hypertension affecting pregnancy     since 28 weeks      Past Surgical History Past Surgical History:   Procedure Laterality Date    D & C WITH SUCTION  07/2018    sab      Family History: Family History   Problem Relation Age of Onset    Hypertension Father     Dementia Father     No Known Problems Mother     No Known Problems Brother       Social History:  reports that she has never smoked. She has never used smokeless tobacco.   reports that she does not currently use alcohol.   reports no history of drug use.        REVIEW OF SYSTEMS              Reports fetal movement is normal             Denies leakage of amniotic fluid.             Denies vaginal bleeding             She reports Regular contractions, frequency: Every 3-5 minutes, intensity strong             All other systems reviewed and are negative    Objective       Vital Signs Range for the last 24 hours  Temperature: Temp:  [97.8 °F (36.6 °C)-98.6 °F (37 °C)] 98.4 °F (36.9 °C)   Temp Source: Temp src: Axillary   BP: BP: (107-129)/(56-95) 121/77   Pulse: Heart Rate:  [] 83   Respirations: Resp:  [16] 16   SPO2:     O2 Amount (l/min):     O2 Devices     Weight: Weight:  [96.6 kg (213 lb)] 96.6 kg (213 lb)       Presentation: cephalic   Cervix: Exam by: Method: sterile exam per RN   Dilation: 6   Effacement: 90   Station: -1        Fetal Heart Rate Assessment   Method: Fetal HR Assessment Method: external, fetal spiral electrode (Poor tracing)   Beats/min: Fetal HR (beats/min): 120   Baseline: Fetal HR Baseline: normal range   Varibility: Fetal HR Variability: moderate (amplitude range 6 to 25 bpm)   Accels: Fetal HR Accelerations: absent   Decels: Fetal HR Decelerations: variable   Tracing Category:       Uterine Assessment   Method: Method:  (Poor tracing)    Frequency (min): Contraction Frequency (Minutes): 2-3   Ctx Count in 10 min:     Duration:     Intensity: Contraction Intensity: no contractions   Intensity by IUPC:     Resting Tone: Uterine Resting Tone: soft by palpation   Resting Tone by IUPC:     Antoni Units:         Constitutional:  Well developed, well nourished, no acute distress, well-groomed.   Respiratory:  Lungs are clear to auscultation bilaterally, normal breath sounds.   Cardiovascular:  Normal rate and rhythm, no murmurs.   Gastrointestinal:  Soft, gravid, nontender.  Uterus: Soft, nontender. Fundus appropriate for dates.  Neurologic:  Alert & oriented x 3,  no focal deficits noted.   Psychiatric:  Speech and behavior appropriate.   Extremities: no cyanosis, clubbing or edema, no evidence of DVT.        Labs:  Lab Results   Component Value Date    WBC 12.40 (H) 2024    HGB 10.5 (L) 2024    HCT 32.9 (L) 2024    MCV 78.7 (L) 2024     2024    POCGLU 79 07/10/2020    CREATININE 0.46 (L) 2024    URICACID 4.9 2024    AST 18 2024    ALT 12 2024     (H) 2024     Results from last 7 days   Lab Units 24  0430   ABO TYPING  A   RH TYPING  Positive   ANTIBODY SCREEN  Negative           Assessment & Plan        labor in third trimester with  delivery        Assessment:   IUP at 36w4d weeks gestation with reactive fetal status.    2.    labor  without ROM  3.   Obstetrical history significant for is remarkable for  x1 with pregnancy complicated by gestational hypertension and GDMA1  4.   GBS status: Unknown  5.   Rh: A+    Plan:  Expectant management  Continuous FHT and TOCO monitoring.   Diet: NPO  Start IV PCN per protocol   Desires epidural for anesthesia. Anesthesia consulted.   Plan of care has been reviewed with patient and questions answered.  Risks, benefits of treatment plan have been discussed.   Consent obtained to proceed with vaginal  delivery        Jayda Rao DO  4/20/2024  13:55 EDT

## 2024-04-20 NOTE — L&D DELIVERY NOTE
[facility]   Vaginal Delivery Note    Patient Name: Winifred Ireland  : 1995  MRN: [id number]    Date of Delivery: 2024     Diagnosis     Pre & Post-Delivery:  Intrauterine pregnancy at 36w4d  Labor status: Spontaneous Onset of Labor      labor in third trimester with  delivery             Problem List    Transfer to Postpartum     Review the Delivery Report for details.     Delivery     Delivery:      YOB: 2023    Time of Birth:  Gestational Age 1:37 PM   36w4d     Anesthesia: Epidural     Delivering clinician:     Forceps?   No   Vacuum? No    Shoulder dystocia present: No        Delivery narrative:  Patient admitted to L&D for  labor. Connected to external FHT and TOCO on presentation to unit. SVE on intake confirmed that she was 6/90/-1. Patient was zac approximately every 3-5 minutes. FHT demonstrated initial Category I findings. Patient received epidural and was comfortable with contractions. AROM with amnihook performed with return of clear fluid. Maternal and fetal tolerance of procedure.  IV Pitocin per protocol initiated.    Labor progressed steadily. Patient felt increasingly more painful with contractions. Patient progressed to complete and felt the urge to push. Patient was placed in dorsal lithotomy position with feet in stirrups bilaterally. Patient was instructed on pushing in concert with contractions. Patient delivered infant vertex in RAVINDER position. Loose nuchal cord was present x1 and reduced. Anterior right shoulder delivered by gentle downward traction then followed by left posterior shoulder by gentle upward traction without complication. No shoulder dystocia. Body followed without difficulty. Male infant delivered pink, active, with good tone and was placed on maternal abdomen. Delayed cord clamping was performed for 1 minute while infant was stimulated and vigorous cry noted. Umbilical cord was doubly clamped and cut by father  "of the baby. Infant was attended to by labor and delivery and nursery nurse. Umbilical cord blood collected. Placenta delivered intact spontaneously without complication via gentle umbilical cord traction. IV Pitocin was started. Fundal massage was initiated and fundus was found to be firm. Blood and clots were cleared from the vaginal vault. The perineum, vaginal walls, cervix, and paraurethral area were inspected thoroughly. No lacerations observed. Hemostasis noted. No episiotomy was performed. Placenta was observed following delivery. Cotyledons all intact and membranes complete. Thick 3 vessel cord confirmed. No complications. Mother and baby were stable  when leaving delivery room.     I was present for all portions of delivery as listed above.         Infant     Findings: male  infant     Infant observations: Weight: No birth weight on file.   Length:   in  Observations/Comments:        Apgars: 8  @ 1 minute /    9  @ 5 minutes   Infant Name: Octavio     Placenta & Cord         Placenta delivered    at        Cord:   present.   Nuchal Cord?  yes; Number of nuchal loops present:      Cord blood obtained:     Cord gases obtained:      Cord gas results: Venous:  No results found for: \"PHCVEN\", \"BECVEN\"    Arterial:  No results found for: \"PHCART\", \"BECART\"     Repair     Episiotomy: Not recorded     No    Lacerations: No   Estimated Blood Loss:  50ml     Quantitative Blood Loss:          Complications     none    Disposition     Mother to Mother Baby/Postpartum  in stable condition currently.  Baby to NBN  in stable condition currently.    Jayda Rao DO  04/20/24  14:05 EDT        "

## 2024-04-21 LAB
BASOPHILS # BLD AUTO: 0.02 10*3/MM3 (ref 0–0.2)
BASOPHILS NFR BLD AUTO: 0.2 % (ref 0–1.5)
DEPRECATED RDW RBC AUTO: 40.4 FL (ref 37–54)
EOSINOPHIL # BLD AUTO: 0.13 10*3/MM3 (ref 0–0.4)
EOSINOPHIL NFR BLD AUTO: 1.2 % (ref 0.3–6.2)
ERYTHROCYTE [DISTWIDTH] IN BLOOD BY AUTOMATED COUNT: 13.9 % (ref 12.3–15.4)
HCT VFR BLD AUTO: 31.8 % (ref 34–46.6)
HGB BLD-MCNC: 10 G/DL (ref 12–15.9)
IMM GRANULOCYTES # BLD AUTO: 0.06 10*3/MM3 (ref 0–0.05)
IMM GRANULOCYTES NFR BLD AUTO: 0.6 % (ref 0–0.5)
LYMPHOCYTES # BLD AUTO: 2.95 10*3/MM3 (ref 0.7–3.1)
LYMPHOCYTES NFR BLD AUTO: 27.5 % (ref 19.6–45.3)
MCH RBC QN AUTO: 25.6 PG (ref 26.6–33)
MCHC RBC AUTO-ENTMCNC: 31.4 G/DL (ref 31.5–35.7)
MCV RBC AUTO: 81.5 FL (ref 79–97)
MONOCYTES # BLD AUTO: 0.58 10*3/MM3 (ref 0.1–0.9)
MONOCYTES NFR BLD AUTO: 5.4 % (ref 5–12)
NEUTROPHILS NFR BLD AUTO: 6.99 10*3/MM3 (ref 1.7–7)
NEUTROPHILS NFR BLD AUTO: 65.1 % (ref 42.7–76)
NRBC BLD AUTO-RTO: 0 /100 WBC (ref 0–0.2)
PLATELET # BLD AUTO: 175 10*3/MM3 (ref 140–450)
PMV BLD AUTO: 10.4 FL (ref 6–12)
RBC # BLD AUTO: 3.9 10*6/MM3 (ref 3.77–5.28)
WBC NRBC COR # BLD AUTO: 10.73 10*3/MM3 (ref 3.4–10.8)

## 2024-04-21 PROCEDURE — 85025 COMPLETE CBC W/AUTO DIFF WBC: CPT | Performed by: STUDENT IN AN ORGANIZED HEALTH CARE EDUCATION/TRAINING PROGRAM

## 2024-04-21 RX ORDER — FERROUS SULFATE 325(65) MG
325 TABLET ORAL
Status: DISCONTINUED | OUTPATIENT
Start: 2024-04-21 | End: 2024-04-22 | Stop reason: HOSPADM

## 2024-04-21 RX ADMIN — DOCUSATE SODIUM 100 MG: 100 CAPSULE, LIQUID FILLED ORAL at 22:00

## 2024-04-21 RX ADMIN — DOCUSATE SODIUM 100 MG: 100 CAPSULE, LIQUID FILLED ORAL at 08:13

## 2024-04-21 RX ADMIN — PRENATAL VITAMINS-IRON FUMARATE 27 MG IRON-FOLIC ACID 0.8 MG TABLET 1 TABLET: at 08:13

## 2024-04-21 RX ADMIN — FERROUS SULFATE TAB 325 MG (65 MG ELEMENTAL FE) 325 MG: 325 (65 FE) TAB at 18:23

## 2024-04-21 NOTE — ANESTHESIA POSTPROCEDURE EVALUATION
Patient: Winifred Ireland    Procedure Summary       Date: 04/20/24 Room / Location:     Anesthesia Start: 1004 Anesthesia Stop: 1345    Procedure: LABOR ANALGESIA Diagnosis:     Scheduled Providers:  Provider: Rola Tsai DO    Anesthesia Type: epidural ASA Status: 2            Anesthesia Type: epidural    Vitals  Vitals Value Taken Time   /89 04/21/24 0700   Temp 98 °F (36.7 °C) 04/21/24 0700   Pulse 87 04/21/24 0700   Resp 18 04/21/24 0700   SpO2 98 % 04/20/24 1032           Post Anesthesia Care and Evaluation    Patient location during evaluation: bedside  Patient participation: complete - patient participated  Level of consciousness: awake  Pain score: 1  Pain management: satisfactory to patient    Airway patency: patent  Anesthetic complications: No anesthetic complications  PONV Status: none  Cardiovascular status: acceptable and hemodynamically stable  Respiratory status: acceptable  Hydration status: acceptable  Post Neuraxial Block status: Motor and sensory function returned to baseline and No signs or symptoms of PDPH

## 2024-04-21 NOTE — PROGRESS NOTES
[facility]  Obstetric Progress Note    Chief Complaint: PPD #1    Subjective     Winifred doing well. Pain is well controlled. Reports ligh lochia without passage of major clots. Ambulating. Tolerating PO intake. Voiding without difficulty or dysuria. Reports flatus.   Bottle feeding. Male infant doing well. Desires circumcision procedure. Reports good mood.   No further complaints at this time.      Objective     Vital Signs Range for the last 24 hours  Temp:  [97.8 °F (36.6 °C)-98.4 °F (36.9 °C)] 98 °F (36.7 °C)   BP: (111-139)/(57-89) 126/89   Heart Rate:  [] 87   Resp:  [16-18] 18                   Intake/Output last 24 hours:      Intake/Output Summary (Last 24 hours) at 4/21/2024 1125  Last data filed at 4/20/2024 1337  Gross per 24 hour   Intake --   Output 850 ml   Net -850 ml       Intake/Output this shift:    No intake/output data recorded.    Physical Exam:  General: A&Ox3. No acute distress.    HEENT Normocephalic, atraumatic    Heart RRR   Lungs CTAB, unlabored breathing   Abdomen Soft, non tender, negative for guarding and rebound   Extremities Exam of extremities: peripheral pulses normal, no pedal edema, no clubbing or cyanosis   Fundus Firm, midline, below umbilicus       Laboratory Results:  Lab Results   Component Value Date    WBC 10.73 04/21/2024    HGB 10.0 (L) 04/21/2024    HCT 31.8 (L) 04/21/2024    MCV 81.5 04/21/2024     04/21/2024    URICACID 4.9 04/20/2024    AST 18 04/20/2024    ALT 12 04/20/2024    HEPBSAG Non-Reactive 11/02/2023         Assessment  PPD# 1 s/p vaginal delivery  Anemia    Plan  Routine postpartum care.  VSS.  Serial lochia, fundal height checks appropriate. Continue serially.   PP Hgb ordered. 10.0. Start PO ferrous sulfate  Pain well controled with tylenol and ibuprofen PRN.  Encourage PO hydration, ambulation, IS.  Male infant doing well. Desires circ.  Bottlefeeding. Lactation available for consult.       Dispo  Plan for discharge home tomorrow pending  patient and infant status.        This note has been electronically signed.    Jayda Rao DO  11:25 EDT  April 21, 2024

## 2024-04-21 NOTE — PLAN OF CARE
Goal Outcome Evaluation:              Outcome Evaluation: Pt. is exhausted but is bonding well with baby and interested in a shower.

## 2024-04-22 VITALS
HEART RATE: 81 BPM | WEIGHT: 213 LBS | DIASTOLIC BLOOD PRESSURE: 78 MMHG | HEIGHT: 63 IN | BODY MASS INDEX: 37.74 KG/M2 | RESPIRATION RATE: 18 BRPM | OXYGEN SATURATION: 98 % | TEMPERATURE: 97.5 F | SYSTOLIC BLOOD PRESSURE: 127 MMHG

## 2024-04-22 RX ORDER — IBUPROFEN 600 MG/1
600 TABLET ORAL EVERY 6 HOURS PRN
Qty: 60 TABLET | Refills: 0 | Status: SHIPPED | OUTPATIENT
Start: 2024-04-22

## 2024-04-22 RX ORDER — UREA 10 %
325 LOTION (ML) TOPICAL
Qty: 30 TABLET | Refills: 0 | Status: SHIPPED | OUTPATIENT
Start: 2024-04-23 | End: 2024-05-23

## 2024-04-22 RX ORDER — DOCUSATE SODIUM 100 MG/1
100 CAPSULE, LIQUID FILLED ORAL 2 TIMES DAILY PRN
Qty: 60 CAPSULE | Refills: 0 | Status: SHIPPED | OUTPATIENT
Start: 2024-04-22

## 2024-04-22 RX ADMIN — ACETAMINOPHEN 650 MG: 325 TABLET ORAL at 00:29

## 2024-04-22 RX ADMIN — PRENATAL VITAMINS-IRON FUMARATE 27 MG IRON-FOLIC ACID 0.8 MG TABLET 1 TABLET: at 08:17

## 2024-04-22 RX ADMIN — IBUPROFEN 600 MG: 600 TABLET, FILM COATED ORAL at 00:29

## 2024-04-22 RX ADMIN — WITCH HAZEL: 500 SOLUTION RECTAL; TOPICAL at 00:36

## 2024-04-22 RX ADMIN — FERROUS SULFATE TAB 325 MG (65 MG ELEMENTAL FE) 325 MG: 325 (65 FE) TAB at 08:17

## 2024-04-22 RX ADMIN — DOCUSATE SODIUM 100 MG: 100 CAPSULE, LIQUID FILLED ORAL at 08:17

## 2024-04-22 NOTE — DISCHARGE SUMMARY
[facility]  Vaginal delivery discharge summary      Patient: Winifred Ireland        MR#:[id number]    Admission  Diagnosis:    labor in third trimester with  delivery     (normal spontaneous vaginal delivery)     Discharge Diagnosis: No diagnosis found.      Date of Admission: 2024  Date of Discharge:   24    Procedures:  Vaginal, Spontaneous     2024    1:37 PM      Service:  Obstetrics    Hospital Course:  Patient underwent vaginal delivery and remained in the hospital for 2 days.  During that time she remained afebrile and hemodynamically stable.  On the day of discharge, she was eating, ambulating and voiding without difficulty.      Labs:    Lab Results   Component Value Date    WBC 10.73 2024    HGB 10.0 (L) 2024    HCT 31.8 (L) 2024    MCV 81.5 2024     2024    POCGLU 79 07/10/2020    CREATININE 0.46 (L) 2024    URICACID 4.9 2024    AST 18 2024    ALT 12 2024     (H) 2024     Results from last 7 days   Lab Units 24  0430   ABO TYPING  A   RH TYPING  Positive   ANTIBODY SCREEN  Negative       Discharge Medications     Discharge Medications        New Medications        Instructions Start Date   docusate sodium 100 MG capsule  Commonly known as: COLACE   100 mg, Oral, 2 Times Daily PRN      ferrous sulfate 325 (65 FE) MG EC tablet   325 mg, Oral, Daily With Breakfast   Start Date: 2024     ibuprofen 600 MG tablet  Commonly known as: ADVIL,MOTRIN   600 mg, Oral, Every 6 Hours PRN             Continue These Medications        Instructions Start Date   Accu-Chek Sharon Plus w/Device kit   TEST BLOOD GLUCOSE 8 TIMES DAILY      Accu-Chek Softclix Lancets lancets   TEST BLOOD GLUCOSE 8 TIMES DAILY      glucose monitor monitoring kit   Use to monitor BG up to 8 times daily      Lancets Misc. kit   Use to monitor BG up to 8 times daily      Prenatal 27-1 27-1 MG tablet tablet   1 tablet, Oral,  Daily               Discharge Disposition:  To Home    Discharge Condition:  Stable    Discharge Diet: Regular    Activity at Discharge: Pelvic rest    Follow-up Appointments  Follow up with [facility] in 4-6 weeks.     Marilyn Omer CNM  04/22/24  08:53 EDT

## 2024-04-22 NOTE — PROGRESS NOTES
4/22/2024  PPD #2    Subjective   Winifred feels well.  Patient describes her lochia as less than menses.  Pain is well controlled       Objective   Temp: Temp:  [97.6 °F (36.4 °C)-98 °F (36.7 °C)] 98 °F (36.7 °C) Temp src: Oral   BP: BP: (129-133)/(81-90) 129/81        Pulse: Heart Rate:  [85-87] 87  RR: Resp:  [18] 18    General:  No acute distress   Abdomen: Fundus firm and beneath umbilicus   Pelvis: deferred     Lab Results   Component Value Date    WBC 10.73 04/21/2024    HGB 10.0 (L) 04/21/2024    HCT 31.8 (L) 04/21/2024    MCV 81.5 04/21/2024     04/21/2024    HEPBSAG Non-Reactive 11/02/2023    AST 18 04/20/2024    ALT 12 04/20/2024    URICACID 4.9 04/20/2024       Assessment  PPD# 2 after vaginal delivery  Anemia, POA    Plan  Discharge to home  Follow up with [facility] in 6 weeks  Prescription for Motrin, iron colace  Advised no tampons, intercourse, or tub baths for 6 weeks.       This note has been electronically signed.    Marilyn Omer CNM  08:49 EDT  April 22, 2024

## 2024-04-22 NOTE — PLAN OF CARE
Goal Outcome Evaluation:                   Pain controlled with meds, VSS

## 2024-05-01 ENCOUNTER — MATERNAL SCREENING (OUTPATIENT)
Dept: CALL CENTER | Facility: HOSPITAL | Age: 29
End: 2024-05-01
Payer: COMMERCIAL

## 2024-05-01 NOTE — OUTREACH NOTE
Maternal Screening Survey      Flowsheet Row Responses   Facility [facility] discharged from? [facility]   Attempt successful? No   Unsuccessful attempts Attempt 1  [left msg on vm]              JESSICA MIRZA - Registered Nurse

## 2024-05-01 NOTE — OUTREACH NOTE
Maternal Screening Survey      Flowsheet Row Responses   Facility [facility] discharged from? [facility]   Attempt successful? No   Unsuccessful attempts Attempt 2              JESSICA MIRZA - Registered Nurse

## 2024-05-02 ENCOUNTER — MATERNAL SCREENING (OUTPATIENT)
Dept: CALL CENTER | Facility: HOSPITAL | Age: 29
End: 2024-05-02
Payer: COMMERCIAL

## 2024-05-02 NOTE — OUTREACH NOTE
Maternal Screening Survey      Flowsheet Row Responses   Facility patient discharged from? [facility]   Attempt successful? No   Unsuccessful attempts Attempt 3   Revoke Decline to participate              Oscar PALOMO - Registered Nurse

## 2024-10-08 ENCOUNTER — HOSPITAL ENCOUNTER (EMERGENCY)
Facility: HOSPITAL | Age: 29
Discharge: HOME | End: 2024-10-09
Attending: EMERGENCY MEDICINE
Payer: COMMERCIAL

## 2024-10-08 ENCOUNTER — APPOINTMENT (OUTPATIENT)
Dept: ULTRASOUND IMAGING | Facility: HOSPITAL | Age: 29
End: 2024-10-08
Payer: COMMERCIAL

## 2024-10-08 DIAGNOSIS — O20.9 VAGINAL BLEEDING IN PREGNANT PATIENT AT LESS THAN 20 WEEKS GESTATION: Primary | ICD-10-CM

## 2024-10-08 LAB
ALBUMIN SERPL-MCNC: 4.2 G/DL (ref 3.5–5.2)
ALBUMIN/GLOB SERPL: 1.4 G/DL
ALP SERPL-CCNC: 100 U/L (ref 39–117)
ALT SERPL W P-5'-P-CCNC: 15 U/L (ref 1–33)
ANION GAP SERPL CALCULATED.3IONS-SCNC: 14 MMOL/L (ref 5–15)
AST SERPL-CCNC: 13 U/L (ref 1–32)
BASOPHILS # BLD AUTO: 0.03 10*3/MM3 (ref 0–0.2)
BASOPHILS NFR BLD AUTO: 0.3 % (ref 0–1.5)
BILIRUB SERPL-MCNC: <0.2 MG/DL (ref 0–1.2)
BUN SERPL-MCNC: 10 MG/DL (ref 6–20)
BUN/CREAT SERPL: 18.5 (ref 7–25)
CALCIUM SPEC-SCNC: 8.9 MG/DL (ref 8.6–10.5)
CHLORIDE SERPL-SCNC: 102 MMOL/L (ref 98–107)
CO2 SERPL-SCNC: 20 MMOL/L (ref 22–29)
CREAT SERPL-MCNC: 0.54 MG/DL (ref 0.57–1)
DEPRECATED RDW RBC AUTO: 45.9 FL (ref 37–54)
EGFRCR SERPLBLD CKD-EPI 2021: 128.8 ML/MIN/1.73
EOSINOPHIL # BLD AUTO: 0.09 10*3/MM3 (ref 0–0.4)
EOSINOPHIL NFR BLD AUTO: 0.9 % (ref 0.3–6.2)
ERYTHROCYTE [DISTWIDTH] IN BLOOD BY AUTOMATED COUNT: 16 % (ref 12.3–15.4)
GLOBULIN UR ELPH-MCNC: 2.9 GM/DL
GLUCOSE SERPL-MCNC: 83 MG/DL (ref 65–99)
HCG INTACT+B SERPL-ACNC: NORMAL MIU/ML
HCT VFR BLD AUTO: 36.9 % (ref 34–46.6)
HGB BLD-MCNC: 11.8 G/DL (ref 12–15.9)
IMM GRANULOCYTES # BLD AUTO: 0.02 10*3/MM3 (ref 0–0.05)
IMM GRANULOCYTES NFR BLD AUTO: 0.2 % (ref 0–0.5)
LYMPHOCYTES # BLD AUTO: 2.35 10*3/MM3 (ref 0.7–3.1)
LYMPHOCYTES NFR BLD AUTO: 24.7 % (ref 19.6–45.3)
MCH RBC QN AUTO: 25.1 PG (ref 26.6–33)
MCHC RBC AUTO-ENTMCNC: 32 G/DL (ref 31.5–35.7)
MCV RBC AUTO: 78.3 FL (ref 79–97)
MONOCYTES # BLD AUTO: 0.42 10*3/MM3 (ref 0.1–0.9)
MONOCYTES NFR BLD AUTO: 4.4 % (ref 5–12)
NEUTROPHILS NFR BLD AUTO: 6.59 10*3/MM3 (ref 1.7–7)
NEUTROPHILS NFR BLD AUTO: 69.5 % (ref 42.7–76)
NRBC BLD AUTO-RTO: 0 /100 WBC (ref 0–0.2)
PLATELET # BLD AUTO: 267 10*3/MM3 (ref 140–450)
PMV BLD AUTO: 10 FL (ref 6–12)
POTASSIUM SERPL-SCNC: 3.5 MMOL/L (ref 3.5–5.2)
PROT SERPL-MCNC: 7.1 G/DL (ref 6–8.5)
RBC # BLD AUTO: 4.71 10*6/MM3 (ref 3.77–5.28)
SODIUM SERPL-SCNC: 136 MMOL/L (ref 136–145)
WBC NRBC COR # BLD AUTO: 9.5 10*3/MM3 (ref 3.4–10.8)

## 2024-10-08 PROCEDURE — 80053 COMPREHEN METABOLIC PANEL: CPT | Performed by: PHYSICIAN ASSISTANT

## 2024-10-08 PROCEDURE — 76817 TRANSVAGINAL US OBSTETRIC: CPT

## 2024-10-08 PROCEDURE — 86900 BLOOD TYPING SEROLOGIC ABO: CPT | Performed by: PHYSICIAN ASSISTANT

## 2024-10-08 PROCEDURE — 36415 COLL VENOUS BLD VENIPUNCTURE: CPT

## 2024-10-08 PROCEDURE — 99284 EMERGENCY DEPT VISIT MOD MDM: CPT

## 2024-10-08 PROCEDURE — 86901 BLOOD TYPING SEROLOGIC RH(D): CPT | Performed by: PHYSICIAN ASSISTANT

## 2024-10-08 PROCEDURE — 84702 CHORIONIC GONADOTROPIN TEST: CPT | Performed by: PHYSICIAN ASSISTANT

## 2024-10-08 PROCEDURE — 85025 COMPLETE CBC W/AUTO DIFF WBC: CPT | Performed by: PHYSICIAN ASSISTANT

## 2024-10-09 VITALS
RESPIRATION RATE: 18 BRPM | OXYGEN SATURATION: 100 % | HEIGHT: 64 IN | DIASTOLIC BLOOD PRESSURE: 70 MMHG | WEIGHT: 198 LBS | BODY MASS INDEX: 33.8 KG/M2 | SYSTOLIC BLOOD PRESSURE: 122 MMHG | HEART RATE: 78 BPM | TEMPERATURE: 98.1 F

## 2024-10-09 LAB
ABO GROUP BLD: NORMAL
NUMBER OF DOSES: NORMAL
RH BLD: POSITIVE

## 2024-10-09 NOTE — ED PROVIDER NOTES
EMERGENCY DEPARTMENT ENCOUNTER    Pt Name: Winifred Ireland  MRN: [id number]  Pt :   1995  Room Number:    Date of encounter:  10/8/2024  PCP: Provider, No Known  ED Provider: CALLIE Ramirez    Historian: Patient    HPI:  Chief Complaint: Vaginal bleeding in pregnancy    Context: Winifred Ireland is a 29 y.o. female who presents to the ED c/o vaginal bleeding. Patient shares that she is 10.5 weeks pregnant and started to experience vaginal bleeding and cramping this evening. Patient shares she established with [facility] but has not had an ultrasound yet with this pregnancy. Patient shares she is still experiencing some bleeding and cramping on interview and exam. She has no other complaints. Patient reports history of prior miscarriage.   HPI     REVIEW OF SYSTEMS  A chief complaint appropriate review of systems was completed and is negative except as noted in the HPI.     PAST MEDICAL HISTORY  Past Medical History:   Diagnosis Date    Anxiety     situational- states she had some chest pain earliet today    Chlamydia 2019    treeated    Depression     no meds    Gestational diabetes 2020    Hypertension affecting pregnancy     since 28 weeks       PAST SURGICAL HISTORY  Past Surgical History:   Procedure Laterality Date    D & C WITH SUCTION  2018    sab       FAMILY HISTORY  Family History   Problem Relation Age of Onset    Hypertension Father     Dementia Father     No Known Problems Mother     No Known Problems Brother        SOCIAL HISTORY  Social History     Socioeconomic History    Marital status: Single   Tobacco Use    Smoking status: Never    Smokeless tobacco: Never   Vaping Use    Vaping status: Never Used   Substance and Sexual Activity    Alcohol use: Not Currently    Drug use: Never    Sexual activity: Defer     Partners: Male       ALLERGIES  Patient has no known allergies.    PHYSICAL EXAM  Physical Exam  GENERAL:   Appears in no acute distress.  HENT:  Nares patent.  EYES: No scleral icterus.  CV: Regular rhythm, regular rate.  RESPIRATORY: Normal effort  ABDOMEN: Soft, diffuse lower tenderness. No evidence of acute abdomen on physical exam. No rebound or guarding. No peritoneal signs.    MUSCULOSKELETAL: No deformities.   NEURO: Alert, moves all extremities, follows commands.  SKIN: Warm, dry, no rash visualized.  PSYCH: Anxious  I have reviewed the triage vital signs and nursing notes.     LAB RESULTS  Results for orders placed or performed during the hospital encounter of 10/08/24   Comprehensive Metabolic Panel    Specimen: Blood   Result Value Ref Range    Glucose 83 65 - 99 mg/dL    BUN 10 6 - 20 mg/dL    Creatinine 0.54 (L) 0.57 - 1.00 mg/dL    Sodium 136 136 - 145 mmol/L    Potassium 3.5 3.5 - 5.2 mmol/L    Chloride 102 98 - 107 mmol/L    CO2 20.0 (L) 22.0 - 29.0 mmol/L    Calcium 8.9 8.6 - 10.5 mg/dL    Total Protein 7.1 6.0 - 8.5 g/dL    Albumin 4.2 3.5 - 5.2 g/dL    ALT (SGPT) 15 1 - 33 U/L    AST (SGOT) 13 1 - 32 U/L    Alkaline Phosphatase 100 39 - 117 U/L    Total Bilirubin <0.2 0.0 - 1.2 mg/dL    Globulin 2.9 gm/dL    A/G Ratio 1.4 g/dL    BUN/Creatinine Ratio 18.5 7.0 - 25.0    Anion Gap 14.0 5.0 - 15.0 mmol/L    eGFR 128.8 >60.0 mL/min/1.73   hCG, Quantitative, Pregnancy    Specimen: Blood   Result Value Ref Range    HCG Quantitative 41,691.00 mIU/mL   CBC Auto Differential    Specimen: Blood   Result Value Ref Range    WBC 9.50 3.40 - 10.80 10*3/mm3    RBC 4.71 3.77 - 5.28 10*6/mm3    Hemoglobin 11.8 (L) 12.0 - 15.9 g/dL    Hematocrit 36.9 34.0 - 46.6 %    MCV 78.3 (L) 79.0 - 97.0 fL    MCH 25.1 (L) 26.6 - 33.0 pg    MCHC 32.0 31.5 - 35.7 g/dL    RDW 16.0 (H) 12.3 - 15.4 %    RDW-SD 45.9 37.0 - 54.0 fl    MPV 10.0 6.0 - 12.0 fL    Platelets 267 140 - 450 10*3/mm3    Neutrophil % 69.5 42.7 - 76.0 %    Lymphocyte % 24.7 19.6 - 45.3 %    Monocyte % 4.4 (L) 5.0 - 12.0 %    Eosinophil % 0.9 0.3 - 6.2 %    Basophil % 0.3 0.0 - 1.5 %    Immature Grans %  0.2 0.0 - 0.5 %    Neutrophils, Absolute 6.59 1.70 - 7.00 10*3/mm3    Lymphocytes, Absolute 2.35 0.70 - 3.10 10*3/mm3    Monocytes, Absolute 0.42 0.10 - 0.90 10*3/mm3    Eosinophils, Absolute 0.09 0.00 - 0.40 10*3/mm3    Basophils, Absolute 0.03 0.00 - 0.20 10*3/mm3    Immature Grans, Absolute 0.02 0.00 - 0.05 10*3/mm3    nRBC 0.0 0.0 - 0.2 /100 WBC    RhIg Evaluation    Specimen: Blood   Result Value Ref Range    ABO Type A     RH type Positive    Doses of Rh Immune Globulin    Specimen: Blood   Result Value Ref Range    Number of Doses       RhIg is not indicated due to the patient's Rh status       If labs were ordered, I independently reviewed the results and considered them in treating the patient.    RADIOLOGY  US Ob Transvaginal   Final Result   Impression:   Intrauterine gestation corresponding to gestational age of 10 weeks and 3 days with normal fetal heart rate of 57 bpm. The amnion is visualized and there is a triangular area of hypoechoic signal change tracking along the amnion which may represent a    moderate-sized subchorionic hemorrhage or may just represent interspersed fluid between the walls of the gestational sac and the amniotic wall. Given that this is an atypical configuration for a true subchorionic hemorrhage, fluid along the amniotic wall    within the gestational sac is favored. Sonographic follow-up and follow-up with beta hCG suggested.            Electronically Signed: Leah Jasmine MD     10/8/2024 11:13 PM EDT     Workstation ID: [id number]        [x] Radiologist's Report Reviewed:  I ordered and independently interpreted the above noted radiographic studies.  See radiologist's dictation for official interpretation.      PROCEDURES    Procedures    No orders to display       MEDICATIONS GIVEN IN ER    Medications - No data to display    MEDICAL DECISION MAKING, PROGRESS, and CONSULTS   Medical Decision Making  This is a nontoxic-appearing 29-year-old female who is 10-1/2 weeks  pregnant and presents to the ED for evaluation of vaginal bleeding and lower abdominal cramping.  No acute or emergent findings on physical exam.  Labs without acute or emergent abnormalities.  Serum hCG clinically correlates to expected gestational age.  Ultrasound completed in the ED demonstrates intrauterine gestation corresponding to gestational age of 10 weeks and 3 days with normal fetal heart rate of 57 bpm.  There are findings consistent with a moderate subchorionic hemorrhage or interspersed fluid between the walls of the gestational sac and the amniotic wall.  Patient instructed on pelvic rest and outpatient follow-up to OB/GYN, return precautions provided.    Problems Addressed:  Vaginal bleeding in pregnant patient at less than 20 weeks gestation: complicated acute illness or injury    Amount and/or Complexity of Data Reviewed  Labs: ordered. Decision-making details documented in ED Course.  Radiology: ordered and independent interpretation performed. Decision-making details documented in ED Course.    Discussion below represents my analysis of pertinent findings related to patient's condition, differential diagnosis, treatment plan and final disposition.    Differential diagnosis:  The differential diagnosis associated with the patient's presentation includes: Ectopic, IUP, threatened/inevitable , along with completed , coagulopathy.      Additional sources  Discussed/ obtained information from independent historians:   [] Spouse  [] Parent  [] Family member  [] Friend  [] EMS   [] Other:  External (non-ED) record review:   [] Inpatient record:   [] Office record:   [] Outpatient record:   [] Prior Outpatient labs:   [] Prior Outpatient radiology:   [] Primary Care record:   [] Outside ED record:   [] Other:   Patient's care impacted by:   [] Diabetes  [x] Hypertension  [] Hyperlipidemia  [] Hypothyroidism   [] Coronary Artery Disease   [] COPD   [] Cancer   [] Obesity  [] GERD   []  Tobacco Abuse   [] Substance Abuse    [] Anxiety   [] Depression   [] Other:   Care significantly affected by Social Determinants of Health (housing and economic circumstances, unemployment)    [] Yes     [x] No   If yes, Patient's care significantly limited by  Social Determinants of Health including:   [] Inadequate housing   [] Low income   [] Alcoholism and drug addiction in family   [] Problems related to primary support group   [] Unemployment   [] Problems related to employment   [] Other Social Determinants of Health:     Orders placed during this visit:  Orders Placed This Encounter   Procedures    US Ob Transvaginal    Comprehensive Metabolic Panel    hCG, Quantitative, Pregnancy    CBC Auto Differential     RhIg Evaluation    Doses of Rh Immune Globulin    CBC & Differential     ED Course:    ED Course as of 10/09/24 010e Oct 08, 2024   2219 Vitals and Telemetry tracing was reviewed and directly interpreted by myself demonstrating blood pressure 131/80, afebrile, heart rate of 111, respirations 18 breaths/min and oxygen saturation 97% on room air [JG]   2220 BP: 131/80 [JG]   2220 Temp: 97.6 °F (36.4 °C) [JG]   2220 Heart Rate: 111 [JG]   2220 Resp: 18 [JG]   2220 SpO2: 97 % [JG]   Wed Oct 09, 2024   0004 Transvaginal US personally interpreted by myself and with official read provided by radiology demonstrates viable intrauterine gestation corresponding to gestational age of 10 weeks and 3 days with normal fetal heartrate of 57 bpm. The amnion is visualized and there is a triangular area of hypoechoic signal changetracking along the amnion which may represent amoderate-sized subchorionic hemorrhage or may just represent interspersed fluid between the walls ofthe gestational sac and the amniotic wall. [JG]   0100 Labs studies were reviewed and directly interpreted by myself and demonstrated no acute or emergent abnormalities.  Serum hCG clinically correlates to expected gestational age. [JG]       ED Course User Index  [JG] Devon Peter PA            DIAGNOSIS  Final diagnoses:   Vaginal bleeding in pregnant patient at less than 20 weeks gestation     DISPOSITION    ED Disposition       ED Disposition   Discharge    Condition   Stable    Comment   --             DISCHARGE    Patient discharged in stable condition.    Reviewed implications of results, diagnosis, meds, responsibility to follow up, warning signs and symptoms of possible worsening, potential complications and reasons to return to ER.    Patient/Family voiced understanding of above instructions.    Discussed plan for discharge, as there is no emergent indication for admission.  Pt/family is agreeable and understands need for follow up and possible repeat testing.  Pt/family is aware that discharge does not mean that nothing is wrong but that it indicates no emergency is currently present that requires admission and they must continue care with follow-up as given below or with a physician of their choice.     FOLLOW-UP  Winifred Gallardo, APRN  [address] [facility] [address]  [place] [id number] [phone]    Schedule an appointment as soon as possible for a visit   Call for follow up with [facility] EMERGENCY DEPARTMENT  [address] [facility] [address] [phone]  Go to   If symptoms worsen         Medication List      No changes were made to your prescriptions during this visit.        Please note that portions of this document were completed with voice recognition software.        Devon Peter PA  10/09/24 0102

## 2024-10-24 ENCOUNTER — LAB (OUTPATIENT)
Dept: LAB | Facility: HOSPITAL | Age: 29
End: 2024-10-24
Payer: COMMERCIAL

## 2024-10-24 ENCOUNTER — TRANSCRIBE ORDERS (OUTPATIENT)
Dept: LAB | Facility: HOSPITAL | Age: 29
End: 2024-10-24
Payer: COMMERCIAL

## 2024-10-24 DIAGNOSIS — Z34.81 PRENATAL CARE, SUBSEQUENT PREGNANCY, FIRST TRIMESTER: Primary | ICD-10-CM

## 2024-10-24 DIAGNOSIS — Z87.59 PERSONAL HISTORY OF TROPHOBLASTIC DISEASE: ICD-10-CM

## 2024-10-24 DIAGNOSIS — Z86.32 HISTORY OF GESTATIONAL DIABETES: ICD-10-CM

## 2024-10-24 DIAGNOSIS — Z34.81 PRENATAL CARE, SUBSEQUENT PREGNANCY, FIRST TRIMESTER: ICD-10-CM

## 2024-10-24 LAB
ABO GROUP BLD: NORMAL
ALBUMIN SERPL-MCNC: 4.1 G/DL (ref 3.5–5.2)
ALBUMIN/GLOB SERPL: 1.1 G/DL
ALP SERPL-CCNC: 100 U/L (ref 39–117)
ALT SERPL W P-5'-P-CCNC: 12 U/L (ref 1–33)
AMPHET+METHAMPHET UR QL: NEGATIVE
AMPHETAMINES UR QL: NEGATIVE
ANION GAP SERPL CALCULATED.3IONS-SCNC: 11.7 MMOL/L (ref 5–15)
AST SERPL-CCNC: 13 U/L (ref 1–32)
BACTERIA UR QL AUTO: ABNORMAL /HPF
BARBITURATES UR QL SCN: NEGATIVE
BENZODIAZ UR QL SCN: NEGATIVE
BILIRUB SERPL-MCNC: <0.2 MG/DL (ref 0–1.2)
BILIRUB UR QL STRIP: NEGATIVE
BLD GP AB SCN SERPL QL: NEGATIVE
BUN SERPL-MCNC: 8 MG/DL (ref 6–20)
BUN/CREAT SERPL: 13.1 (ref 7–25)
BUPRENORPHINE SERPL-MCNC: NEGATIVE NG/ML
CALCIUM SPEC-SCNC: 9.1 MG/DL (ref 8.6–10.5)
CANNABINOIDS SERPL QL: NEGATIVE
CHLORIDE SERPL-SCNC: 101 MMOL/L (ref 98–107)
CLARITY UR: ABNORMAL
CO2 SERPL-SCNC: 22.3 MMOL/L (ref 22–29)
COCAINE UR QL: NEGATIVE
COLOR UR: YELLOW
CREAT SERPL-MCNC: 0.61 MG/DL (ref 0.57–1)
DEPRECATED RDW RBC AUTO: 45.3 FL (ref 37–54)
EGFRCR SERPLBLD CKD-EPI 2021: 125.1 ML/MIN/1.73
ERYTHROCYTE [DISTWIDTH] IN BLOOD BY AUTOMATED COUNT: 15.9 % (ref 12.3–15.4)
FENTANYL UR-MCNC: NEGATIVE NG/ML
GLOBULIN UR ELPH-MCNC: 3.6 GM/DL
GLUCOSE SERPL-MCNC: 86 MG/DL (ref 65–99)
GLUCOSE UR STRIP-MCNC: NEGATIVE MG/DL
HBA1C MFR BLD: 5.1 % (ref 4.8–5.6)
HBV SURFACE AG SERPL QL IA: NORMAL
HCT VFR BLD AUTO: 37.6 % (ref 34–46.6)
HCV AB SER QL: NORMAL
HGB BLD-MCNC: 11.8 G/DL (ref 12–15.9)
HGB UR QL STRIP.AUTO: ABNORMAL
HIV 1+2 AB+HIV1 P24 AG SERPL QL IA: NORMAL
HYALINE CASTS UR QL AUTO: ABNORMAL /LPF
KETONES UR QL STRIP: NEGATIVE
LDH SERPL-CCNC: 192 U/L (ref 135–214)
LEUKOCYTE ESTERASE UR QL STRIP.AUTO: ABNORMAL
MCH RBC QN AUTO: 24.6 PG (ref 26.6–33)
MCHC RBC AUTO-ENTMCNC: 31.4 G/DL (ref 31.5–35.7)
MCV RBC AUTO: 78.5 FL (ref 79–97)
METHADONE UR QL SCN: NEGATIVE
NITRITE UR QL STRIP: NEGATIVE
OPIATES UR QL: NEGATIVE
OXYCODONE UR QL SCN: NEGATIVE
PCP UR QL SCN: NEGATIVE
PH UR STRIP.AUTO: 6.5 [PH] (ref 5–8)
PLATELET # BLD AUTO: 293 10*3/MM3 (ref 140–450)
PMV BLD AUTO: 9.8 FL (ref 6–12)
POTASSIUM SERPL-SCNC: 3.7 MMOL/L (ref 3.5–5.2)
PROT SERPL-MCNC: 7.7 G/DL (ref 6–8.5)
PROT UR QL STRIP: NEGATIVE
RBC # BLD AUTO: 4.79 10*6/MM3 (ref 3.77–5.28)
RBC # UR STRIP: ABNORMAL /HPF
REF LAB TEST METHOD: ABNORMAL
RH BLD: POSITIVE
SODIUM SERPL-SCNC: 135 MMOL/L (ref 136–145)
SP GR UR STRIP: 1.02 (ref 1–1.03)
SQUAMOUS #/AREA URNS HPF: ABNORMAL /HPF
TRANS CELLS #/AREA URNS HPF: ABNORMAL /HPF
TREPONEMA PALLIDUM IGG+IGM AB [PRESENCE] IN SERUM OR PLASMA BY IMMUNOASSAY: NORMAL
TRICYCLICS UR QL SCN: NEGATIVE
URATE SERPL-MCNC: 3.2 MG/DL (ref 2.4–5.7)
UROBILINOGEN UR QL STRIP: ABNORMAL
WBC # UR STRIP: ABNORMAL /HPF
WBC NRBC COR # BLD AUTO: 7.97 10*3/MM3 (ref 3.4–10.8)

## 2024-10-24 PROCEDURE — 85027 COMPLETE CBC AUTOMATED: CPT

## 2024-10-24 PROCEDURE — 84550 ASSAY OF BLOOD/URIC ACID: CPT

## 2024-10-24 PROCEDURE — 87591 N.GONORRHOEAE DNA AMP PROB: CPT

## 2024-10-24 PROCEDURE — 36415 COLL VENOUS BLD VENIPUNCTURE: CPT

## 2024-10-24 PROCEDURE — 86780 TREPONEMA PALLIDUM: CPT

## 2024-10-24 PROCEDURE — 86803 HEPATITIS C AB TEST: CPT

## 2024-10-24 PROCEDURE — 87086 URINE CULTURE/COLONY COUNT: CPT

## 2024-10-24 PROCEDURE — 86787 VARICELLA-ZOSTER ANTIBODY: CPT

## 2024-10-24 PROCEDURE — 83615 LACTATE (LD) (LDH) ENZYME: CPT

## 2024-10-24 PROCEDURE — 80053 COMPREHEN METABOLIC PANEL: CPT | Performed by: STUDENT IN AN ORGANIZED HEALTH CARE EDUCATION/TRAINING PROGRAM

## 2024-10-24 PROCEDURE — G0432 EIA HIV-1/HIV-2 SCREEN: HCPCS

## 2024-10-24 PROCEDURE — 87491 CHLMYD TRACH DNA AMP PROBE: CPT

## 2024-10-24 PROCEDURE — 86850 RBC ANTIBODY SCREEN: CPT

## 2024-10-24 PROCEDURE — 83036 HEMOGLOBIN GLYCOSYLATED A1C: CPT | Performed by: STUDENT IN AN ORGANIZED HEALTH CARE EDUCATION/TRAINING PROGRAM

## 2024-10-24 PROCEDURE — 86900 BLOOD TYPING SEROLOGIC ABO: CPT

## 2024-10-24 PROCEDURE — 81001 URINALYSIS AUTO W/SCOPE: CPT

## 2024-10-24 PROCEDURE — 86901 BLOOD TYPING SEROLOGIC RH(D): CPT

## 2024-10-24 PROCEDURE — 87340 HEPATITIS B SURFACE AG IA: CPT

## 2024-10-24 PROCEDURE — 86762 RUBELLA ANTIBODY: CPT

## 2024-10-24 PROCEDURE — 80307 DRUG TEST PRSMV CHEM ANLYZR: CPT

## 2024-10-25 LAB
BACTERIA SPEC AEROBE CULT: NORMAL
RUBV IGG SERPL IA-ACNC: 1.29 INDEX
VZV IGG SER QL IA: REACTIVE

## 2024-10-29 LAB
C TRACH RRNA SPEC QL NAA+PROBE: NEGATIVE
N GONORRHOEA RRNA SPEC QL NAA+PROBE: NEGATIVE

## 2025-02-06 ENCOUNTER — LAB (OUTPATIENT)
Dept: LAB | Facility: HOSPITAL | Age: 30
End: 2025-02-06
Payer: COMMERCIAL

## 2025-02-06 ENCOUNTER — TRANSCRIBE ORDERS (OUTPATIENT)
Dept: LAB | Facility: HOSPITAL | Age: 30
End: 2025-02-06
Payer: COMMERCIAL

## 2025-02-06 DIAGNOSIS — Z34.82 PRENATAL CARE, SUBSEQUENT PREGNANCY, SECOND TRIMESTER: Primary | ICD-10-CM

## 2025-02-06 DIAGNOSIS — Z34.82 PRENATAL CARE, SUBSEQUENT PREGNANCY, SECOND TRIMESTER: ICD-10-CM

## 2025-02-06 LAB
BLD GP AB SCN SERPL QL: NEGATIVE
DEPRECATED RDW RBC AUTO: 42.3 FL (ref 37–54)
ERYTHROCYTE [DISTWIDTH] IN BLOOD BY AUTOMATED COUNT: 14.4 % (ref 12.3–15.4)
GLUCOSE 1H P 100 G GLC PO SERPL-MCNC: 143 MG/DL (ref 65–139)
HCT VFR BLD AUTO: 33.2 % (ref 34–46.6)
HGB BLD-MCNC: 10.2 G/DL (ref 12–15.9)
MCH RBC QN AUTO: 25.1 PG (ref 26.6–33)
MCHC RBC AUTO-ENTMCNC: 30.7 G/DL (ref 31.5–35.7)
MCV RBC AUTO: 81.6 FL (ref 79–97)
PLATELET # BLD AUTO: 222 10*3/MM3 (ref 140–450)
PMV BLD AUTO: 9.9 FL (ref 6–12)
RBC # BLD AUTO: 4.07 10*6/MM3 (ref 3.77–5.28)
TREPONEMA PALLIDUM IGG+IGM AB [PRESENCE] IN SERUM OR PLASMA BY IMMUNOASSAY: NORMAL
WBC NRBC COR # BLD AUTO: 8.05 10*3/MM3 (ref 3.4–10.8)

## 2025-02-06 PROCEDURE — 86850 RBC ANTIBODY SCREEN: CPT

## 2025-02-06 PROCEDURE — 85027 COMPLETE CBC AUTOMATED: CPT

## 2025-02-06 PROCEDURE — 36415 COLL VENOUS BLD VENIPUNCTURE: CPT

## 2025-02-06 PROCEDURE — 82948 REAGENT STRIP/BLOOD GLUCOSE: CPT | Performed by: STUDENT IN AN ORGANIZED HEALTH CARE EDUCATION/TRAINING PROGRAM

## 2025-02-06 PROCEDURE — 82950 GLUCOSE TEST: CPT

## 2025-02-06 PROCEDURE — 86780 TREPONEMA PALLIDUM: CPT

## 2025-03-04 ENCOUNTER — TRANSCRIBE ORDERS (OUTPATIENT)
Dept: LAB | Facility: HOSPITAL | Age: 30
End: 2025-03-04
Payer: COMMERCIAL

## 2025-03-04 ENCOUNTER — LAB (OUTPATIENT)
Dept: LAB | Facility: HOSPITAL | Age: 30
End: 2025-03-04
Payer: COMMERCIAL

## 2025-03-04 DIAGNOSIS — Z34.83 PRENATAL CARE, SUBSEQUENT PREGNANCY, THIRD TRIMESTER: ICD-10-CM

## 2025-03-04 DIAGNOSIS — Z34.83 PRENATAL CARE, SUBSEQUENT PREGNANCY, THIRD TRIMESTER: Primary | ICD-10-CM

## 2025-03-04 LAB
GLUCOSE 1H P 100 G GLC PO SERPL-MCNC: 134 MG/DL (ref 74–180)
GLUCOSE 2H P 100 G GLC PO SERPL-MCNC: 90 MG/DL (ref 74–155)
GLUCOSE 3H P 100 G GLC PO SERPL-MCNC: 43 MG/DL (ref 74–140)
GLUCOSE P FAST SERPL-MCNC: 85 MG/DL (ref 74–106)

## 2025-03-04 PROCEDURE — 82948 REAGENT STRIP/BLOOD GLUCOSE: CPT

## 2025-03-04 PROCEDURE — 36415 COLL VENOUS BLD VENIPUNCTURE: CPT

## 2025-03-04 PROCEDURE — 82952 GTT-ADDED SAMPLES: CPT

## 2025-03-04 PROCEDURE — 82951 GLUCOSE TOLERANCE TEST (GTT): CPT

## 2025-03-09 ENCOUNTER — HOSPITAL ENCOUNTER (OUTPATIENT)
Facility: HOSPITAL | Age: 30
Discharge: HOME | End: 2025-03-09
Attending: STUDENT IN AN ORGANIZED HEALTH CARE EDUCATION/TRAINING PROGRAM | Admitting: OBSTETRICS & GYNECOLOGY
Payer: COMMERCIAL

## 2025-03-09 VITALS
WEIGHT: 218 LBS | OXYGEN SATURATION: 97 % | HEIGHT: 64 IN | SYSTOLIC BLOOD PRESSURE: 134 MMHG | RESPIRATION RATE: 16 BRPM | HEART RATE: 97 BPM | BODY MASS INDEX: 37.22 KG/M2 | DIASTOLIC BLOOD PRESSURE: 81 MMHG | TEMPERATURE: 97.7 F

## 2025-03-09 PROBLEM — O36.8130 DECREASED FETAL MOVEMENT AFFECTING MANAGEMENT OF PREGNANCY IN THIRD TRIMESTER, SINGLE OR UNSPECIFIED FETUS: Status: ACTIVE | Noted: 2025-03-09

## 2025-03-09 LAB
BACTERIA UR QL AUTO: ABNORMAL /HPF
BILIRUB UR QL STRIP: NEGATIVE
CLARITY UR: ABNORMAL
COLOR UR: YELLOW
GLUCOSE UR STRIP-MCNC: NEGATIVE MG/DL
HGB UR QL STRIP.AUTO: NEGATIVE
HYALINE CASTS UR QL AUTO: ABNORMAL /LPF
KETONES UR QL STRIP: ABNORMAL
LEUKOCYTE ESTERASE UR QL STRIP.AUTO: ABNORMAL
MUCOUS THREADS URNS QL MICRO: ABNORMAL /HPF
NITRITE UR QL STRIP: NEGATIVE
PH UR STRIP.AUTO: 6 [PH] (ref 5–8)
PROT UR QL STRIP: NEGATIVE
RBC # UR STRIP: ABNORMAL /HPF
REF LAB TEST METHOD: ABNORMAL
SP GR UR STRIP: 1.03 (ref 1–1.03)
SQUAMOUS #/AREA URNS HPF: ABNORMAL /HPF
UROBILINOGEN UR QL STRIP: ABNORMAL
WBC # UR STRIP: ABNORMAL /HPF

## 2025-03-09 PROCEDURE — G0378 HOSPITAL OBSERVATION PER HR: HCPCS

## 2025-03-09 PROCEDURE — G0463 HOSPITAL OUTPT CLINIC VISIT: HCPCS

## 2025-03-09 PROCEDURE — 59025 FETAL NON-STRESS TEST: CPT

## 2025-03-09 PROCEDURE — 81001 URINALYSIS AUTO W/SCOPE: CPT | Performed by: OBSTETRICS & GYNECOLOGY

## 2025-03-09 RX ORDER — FERROUS SULFATE 325(65) MG
325 TABLET ORAL
COMMUNITY

## 2025-03-09 NOTE — H&P
"Winifred Chaney  1995  [id number]  [id number]    CC: decreased fetal movement,spotting  HPI:  Patient is 29 y.o. female   currently at 32w2d presents with c/o decreased fetal movement.  Normal FM thru Friday.  Saturday baby has moved, just less frequently and less vigorously.  Pt also with spotting seen with wiping only.  PNC comp by first trim bleeding and hx  delivery    PMH:  Current meds: PNV  Illnesses: none  Surgeries: D and C  Allergies: NKDA    Past OB History:       OB History    Para Term  AB Living   4 2 1 1 1 2   SAB IAB Ectopic Molar Multiple Live Births   1 0 0 0 0 2      # Outcome Date GA Lbr Aditya/2nd Weight Sex Type Anes PTL Lv   4 Current            3  24 36w4d  2840 g (6 lb 4.2 oz) M Vag-Spont EPI Y CATIE      Name: Leeroy RUBIO Zambrano      Apgar1: 8  Apgar5: 9   2 Term 20 39w1d  3050 g (6 lb 11.6 oz) F Vag-Spont EPI N CATIE      Name: EDGARD CHANEY      Apgar1: 8  Apgar5: 9   1 SAB 2018 10w0d    SAB               SH: tob neg , EtOH neg, drugs neg      General ROS: decreased FM, spotting.   All other systems reviewed and are negative.      Physical Examination: General appearance - alert, well appearing, and in no distress  Vital signs - /81   Pulse 97   Temp 97.7 °F (36.5 °C) (Oral)   Resp 16   Ht 162.6 cm (64\")   Wt 98.9 kg (218 lb)   LMP 2024   SpO2 97%   BMI 37.42 kg/m²   HEENT: normocephalic, atraumatic,oropharynx clear, appearance of ears and nose normal  Neck - supple, no significant adenopathy, no thyromegaly  Lymphatics - no palpable lymphadenopathy in the neck or groin, no hepatosplenomegaly  Chest - clear to auscultation, no wheezes, rales or rhonchi, respiratory effort non-labored  Heart - normal rate, regular rhythm, no murmurs, rubs, clicks or gallops, no JVD, no lower extremity edema  Abdomen - soft, nontender, nondistended, no masses, no hepatosplenomegaly  no rebound tenderness noted, bowel sounds " normal  Vaginal Exam: 1-2/th/-2, no blood in vault,external genitalia normal  Extremities - no pedal edema noted, no calf tenderness  Skin -warm and dry, normal coloration and turgor, no rashes, no suspicious skin lesions noted    Fetal monitoring: indication decreased fetal movement, onset 0040, offset 0120, baseline 130, mod BTB variability, multiple accels (15 X 15), no decels, no contractions, interpretation reactive NST    Radiology     Assessment 1)IUP 32 2/7 weeks   2)decreased FM- reassuring NST.  FM here   3)spotting- no blood in vault, likely urinary source    Plan 1)observe   2)home after UA results    Gustavo Vasquez MD  3/9/2025  01:19 EST

## 2025-03-27 ENCOUNTER — HOSPITAL ENCOUNTER (INPATIENT)
Facility: HOSPITAL | Age: 30
LOS: 1 days | Discharge: HOME | End: 2025-03-27
Attending: STUDENT IN AN ORGANIZED HEALTH CARE EDUCATION/TRAINING PROGRAM | Admitting: STUDENT IN AN ORGANIZED HEALTH CARE EDUCATION/TRAINING PROGRAM
Payer: COMMERCIAL

## 2025-03-27 VITALS
SYSTOLIC BLOOD PRESSURE: 126 MMHG | WEIGHT: 219 LBS | OXYGEN SATURATION: 98 % | BODY MASS INDEX: 37.39 KG/M2 | HEART RATE: 108 BPM | RESPIRATION RATE: 16 BRPM | DIASTOLIC BLOOD PRESSURE: 89 MMHG | HEIGHT: 64 IN | TEMPERATURE: 98.6 F

## 2025-03-27 PROBLEM — O60.00 PRETERM LABOR: Status: ACTIVE | Noted: 2025-03-27

## 2025-03-27 LAB
ABO GROUP BLD: NORMAL
ALP SERPL-CCNC: 168 U/L (ref 39–117)
ALT SERPL W P-5'-P-CCNC: 14 U/L (ref 1–33)
AST SERPL-CCNC: 15 U/L (ref 1–32)
BILIRUB SERPL-MCNC: 0.2 MG/DL (ref 0–1.2)
BLD GP AB SCN SERPL QL: NEGATIVE
CREAT SERPL-MCNC: 0.39 MG/DL (ref 0.57–1)
DEPRECATED RDW RBC AUTO: 43.1 FL (ref 37–54)
ERYTHROCYTE [DISTWIDTH] IN BLOOD BY AUTOMATED COUNT: 15.8 % (ref 12.3–15.4)
HCT VFR BLD AUTO: 33.1 % (ref 34–46.6)
HGB BLD-MCNC: 10.6 G/DL (ref 12–15.9)
LDH SERPL-CCNC: 232 U/L (ref 135–214)
MCH RBC QN AUTO: 24.8 PG (ref 26.6–33)
MCHC RBC AUTO-ENTMCNC: 32 G/DL (ref 31.5–35.7)
MCV RBC AUTO: 77.5 FL (ref 79–97)
PLATELET # BLD AUTO: 199 10*3/MM3 (ref 140–450)
PMV BLD AUTO: 9.8 FL (ref 6–12)
RBC # BLD AUTO: 4.27 10*6/MM3 (ref 3.77–5.28)
RH BLD: POSITIVE
T&S EXPIRATION DATE: NORMAL
TREPONEMA PALLIDUM IGG+IGM AB [PRESENCE] IN SERUM OR PLASMA BY IMMUNOASSAY: NORMAL
URATE SERPL-MCNC: 3.7 MG/DL (ref 2.4–5.7)
WBC NRBC COR # BLD AUTO: 9.08 10*3/MM3 (ref 3.4–10.8)

## 2025-03-27 PROCEDURE — 36415 COLL VENOUS BLD VENIPUNCTURE: CPT | Performed by: STUDENT IN AN ORGANIZED HEALTH CARE EDUCATION/TRAINING PROGRAM

## 2025-03-27 PROCEDURE — 59025 FETAL NON-STRESS TEST: CPT

## 2025-03-27 PROCEDURE — 84460 ALANINE AMINO (ALT) (SGPT): CPT | Performed by: STUDENT IN AN ORGANIZED HEALTH CARE EDUCATION/TRAINING PROGRAM

## 2025-03-27 PROCEDURE — 84075 ASSAY ALKALINE PHOSPHATASE: CPT | Performed by: STUDENT IN AN ORGANIZED HEALTH CARE EDUCATION/TRAINING PROGRAM

## 2025-03-27 PROCEDURE — 84550 ASSAY OF BLOOD/URIC ACID: CPT | Performed by: STUDENT IN AN ORGANIZED HEALTH CARE EDUCATION/TRAINING PROGRAM

## 2025-03-27 PROCEDURE — 25010000002 BETAMETHASONE ACET & SOD PHOS PER 4 MG: Performed by: STUDENT IN AN ORGANIZED HEALTH CARE EDUCATION/TRAINING PROGRAM

## 2025-03-27 PROCEDURE — 85027 COMPLETE CBC AUTOMATED: CPT | Performed by: STUDENT IN AN ORGANIZED HEALTH CARE EDUCATION/TRAINING PROGRAM

## 2025-03-27 PROCEDURE — 82565 ASSAY OF CREATININE: CPT | Performed by: STUDENT IN AN ORGANIZED HEALTH CARE EDUCATION/TRAINING PROGRAM

## 2025-03-27 PROCEDURE — 86901 BLOOD TYPING SEROLOGIC RH(D): CPT | Performed by: STUDENT IN AN ORGANIZED HEALTH CARE EDUCATION/TRAINING PROGRAM

## 2025-03-27 PROCEDURE — 96372 THER/PROPH/DIAG INJ SC/IM: CPT

## 2025-03-27 PROCEDURE — 25810000003 LACTATED RINGERS PER 1000 ML: Performed by: STUDENT IN AN ORGANIZED HEALTH CARE EDUCATION/TRAINING PROGRAM

## 2025-03-27 PROCEDURE — 87081 CULTURE SCREEN ONLY: CPT | Performed by: STUDENT IN AN ORGANIZED HEALTH CARE EDUCATION/TRAINING PROGRAM

## 2025-03-27 PROCEDURE — G0463 HOSPITAL OUTPT CLINIC VISIT: HCPCS

## 2025-03-27 PROCEDURE — 82247 BILIRUBIN TOTAL: CPT | Performed by: STUDENT IN AN ORGANIZED HEALTH CARE EDUCATION/TRAINING PROGRAM

## 2025-03-27 PROCEDURE — 86780 TREPONEMA PALLIDUM: CPT | Performed by: STUDENT IN AN ORGANIZED HEALTH CARE EDUCATION/TRAINING PROGRAM

## 2025-03-27 PROCEDURE — 86900 BLOOD TYPING SEROLOGIC ABO: CPT | Performed by: STUDENT IN AN ORGANIZED HEALTH CARE EDUCATION/TRAINING PROGRAM

## 2025-03-27 PROCEDURE — 84450 TRANSFERASE (AST) (SGOT): CPT | Performed by: STUDENT IN AN ORGANIZED HEALTH CARE EDUCATION/TRAINING PROGRAM

## 2025-03-27 PROCEDURE — 25010000002 PENICILLIN G POTASSIUM PER 600000 UNITS: Performed by: STUDENT IN AN ORGANIZED HEALTH CARE EDUCATION/TRAINING PROGRAM

## 2025-03-27 PROCEDURE — 83615 LACTATE (LD) (LDH) ENZYME: CPT | Performed by: STUDENT IN AN ORGANIZED HEALTH CARE EDUCATION/TRAINING PROGRAM

## 2025-03-27 PROCEDURE — 86850 RBC ANTIBODY SCREEN: CPT | Performed by: STUDENT IN AN ORGANIZED HEALTH CARE EDUCATION/TRAINING PROGRAM

## 2025-03-27 RX ORDER — SODIUM CHLORIDE 0.9 % (FLUSH) 0.9 %
10 SYRINGE (ML) INJECTION AS NEEDED
Status: DISCONTINUED | OUTPATIENT
Start: 2025-03-27 | End: 2025-03-28 | Stop reason: HOSPADM

## 2025-03-27 RX ORDER — SODIUM CHLORIDE 9 MG/ML
40 INJECTION, SOLUTION INTRAVENOUS AS NEEDED
Status: DISCONTINUED | OUTPATIENT
Start: 2025-03-27 | End: 2025-03-28 | Stop reason: HOSPADM

## 2025-03-27 RX ORDER — MISOPROSTOL 200 UG/1
800 TABLET ORAL AS NEEDED
OUTPATIENT
Start: 2025-03-27

## 2025-03-27 RX ORDER — ONDANSETRON 4 MG/1
4 TABLET, ORALLY DISINTEGRATING ORAL EVERY 6 HOURS PRN
Status: DISCONTINUED | OUTPATIENT
Start: 2025-03-27 | End: 2025-03-28 | Stop reason: HOSPADM

## 2025-03-27 RX ORDER — BUTORPHANOL TARTRATE 1 MG/ML
1 INJECTION, SOLUTION INTRAMUSCULAR; INTRAVENOUS
Status: DISCONTINUED | OUTPATIENT
Start: 2025-03-27 | End: 2025-03-28 | Stop reason: HOSPADM

## 2025-03-27 RX ORDER — CARBOPROST TROMETHAMINE 250 UG/ML
250 INJECTION, SOLUTION INTRAMUSCULAR AS NEEDED
OUTPATIENT
Start: 2025-03-27

## 2025-03-27 RX ORDER — SODIUM CHLORIDE 0.9 % (FLUSH) 0.9 %
10 SYRINGE (ML) INJECTION EVERY 12 HOURS SCHEDULED
Status: DISCONTINUED | OUTPATIENT
Start: 2025-03-27 | End: 2025-03-28 | Stop reason: HOSPADM

## 2025-03-27 RX ORDER — IBUPROFEN 600 MG/1
600 TABLET, FILM COATED ORAL EVERY 6 HOURS PRN
OUTPATIENT
Start: 2025-03-27

## 2025-03-27 RX ORDER — BETAMETHASONE SODIUM PHOSPHATE AND BETAMETHASONE ACETATE 3; 3 MG/ML; MG/ML
12 INJECTION, SUSPENSION INTRA-ARTICULAR; INTRALESIONAL; INTRAMUSCULAR; SOFT TISSUE EVERY 24 HOURS
Status: DISCONTINUED | OUTPATIENT
Start: 2025-03-27 | End: 2025-03-28 | Stop reason: HOSPADM

## 2025-03-27 RX ORDER — LIDOCAINE HYDROCHLORIDE 10 MG/ML
0.5 INJECTION, SOLUTION EPIDURAL; INFILTRATION; INTRACAUDAL; PERINEURAL ONCE AS NEEDED
Status: DISCONTINUED | OUTPATIENT
Start: 2025-03-27 | End: 2025-03-28 | Stop reason: HOSPADM

## 2025-03-27 RX ORDER — CALCIUM CARBONATE 500 MG/1
2 TABLET, CHEWABLE ORAL 3 TIMES DAILY PRN
OUTPATIENT
Start: 2025-03-27

## 2025-03-27 RX ORDER — OXYTOCIN/0.9 % SODIUM CHLORIDE 30/500 ML
30 PLASTIC BAG, INJECTION (ML) INTRAVENOUS ONCE
OUTPATIENT
Start: 2025-03-27 | End: 2025-03-27

## 2025-03-27 RX ORDER — PROMETHAZINE HYDROCHLORIDE 12.5 MG/1
12.5 SUPPOSITORY RECTAL EVERY 6 HOURS PRN
Status: DISCONTINUED | OUTPATIENT
Start: 2025-03-27 | End: 2025-03-28 | Stop reason: HOSPADM

## 2025-03-27 RX ORDER — ACETAMINOPHEN 325 MG/1
650 TABLET ORAL EVERY 4 HOURS PRN
Status: DISCONTINUED | OUTPATIENT
Start: 2025-03-27 | End: 2025-03-28 | Stop reason: HOSPADM

## 2025-03-27 RX ORDER — ONDANSETRON 2 MG/ML
4 INJECTION INTRAMUSCULAR; INTRAVENOUS EVERY 6 HOURS PRN
Status: DISCONTINUED | OUTPATIENT
Start: 2025-03-27 | End: 2025-03-28 | Stop reason: HOSPADM

## 2025-03-27 RX ORDER — OXYCODONE AND ACETAMINOPHEN 10; 325 MG/1; MG/1
2 TABLET ORAL EVERY 4 HOURS PRN
Refills: 0 | OUTPATIENT
Start: 2025-03-27 | End: 2025-04-06

## 2025-03-27 RX ORDER — PENICILLIN G 3000000 [IU]/50ML
3 INJECTION, SOLUTION INTRAVENOUS EVERY 4 HOURS
Status: DISCONTINUED | OUTPATIENT
Start: 2025-03-27 | End: 2025-03-28 | Stop reason: HOSPADM

## 2025-03-27 RX ORDER — MAGNESIUM CARB/ALUMINUM HYDROX 105-160MG
30 TABLET,CHEWABLE ORAL ONCE
Status: DISCONTINUED | OUTPATIENT
Start: 2025-03-27 | End: 2025-03-28 | Stop reason: HOSPADM

## 2025-03-27 RX ORDER — OXYTOCIN/0.9 % SODIUM CHLORIDE 30/500 ML
30 PLASTIC BAG, INJECTION (ML) INTRAVENOUS CONTINUOUS
OUTPATIENT
Start: 2025-03-27 | End: 2025-03-27

## 2025-03-27 RX ORDER — TERBUTALINE SULFATE 1 MG/ML
0.25 INJECTION SUBCUTANEOUS AS NEEDED
Status: DISCONTINUED | OUTPATIENT
Start: 2025-03-27 | End: 2025-03-28 | Stop reason: HOSPADM

## 2025-03-27 RX ORDER — BUTORPHANOL TARTRATE 2 MG/ML
2 INJECTION, SOLUTION INTRAMUSCULAR; INTRAVENOUS
Status: DISCONTINUED | OUTPATIENT
Start: 2025-03-27 | End: 2025-03-28 | Stop reason: HOSPADM

## 2025-03-27 RX ORDER — PROMETHAZINE HYDROCHLORIDE 12.5 MG/1
12.5 TABLET ORAL EVERY 6 HOURS PRN
Status: DISCONTINUED | OUTPATIENT
Start: 2025-03-27 | End: 2025-03-28 | Stop reason: HOSPADM

## 2025-03-27 RX ORDER — ONDANSETRON 4 MG/1
4 TABLET, ORALLY DISINTEGRATING ORAL EVERY 6 HOURS PRN
OUTPATIENT
Start: 2025-03-27

## 2025-03-27 RX ORDER — ACETAMINOPHEN 325 MG/1
650 TABLET ORAL EVERY 4 HOURS PRN
OUTPATIENT
Start: 2025-03-27

## 2025-03-27 RX ORDER — METHYLERGONOVINE MALEATE 0.2 MG/ML
200 INJECTION INTRAVENOUS ONCE AS NEEDED
OUTPATIENT
Start: 2025-03-27

## 2025-03-27 RX ORDER — OXYCODONE AND ACETAMINOPHEN 5; 325 MG/1; MG/1
1 TABLET ORAL EVERY 4 HOURS PRN
Refills: 0 | OUTPATIENT
Start: 2025-03-27 | End: 2025-04-06

## 2025-03-27 RX ORDER — ONDANSETRON 2 MG/ML
4 INJECTION INTRAMUSCULAR; INTRAVENOUS EVERY 6 HOURS PRN
OUTPATIENT
Start: 2025-03-27

## 2025-03-27 RX ORDER — SODIUM CHLORIDE, SODIUM LACTATE, POTASSIUM CHLORIDE, CALCIUM CHLORIDE 600; 310; 30; 20 MG/100ML; MG/100ML; MG/100ML; MG/100ML
125 INJECTION, SOLUTION INTRAVENOUS CONTINUOUS
Status: DISCONTINUED | OUTPATIENT
Start: 2025-03-27 | End: 2025-03-28 | Stop reason: HOSPADM

## 2025-03-27 RX ADMIN — BETAMETHASONE SODIUM PHOSPHATE AND BETAMETHASONE ACETATE 12 MG: 3; 3 INJECTION, SUSPENSION INTRA-ARTICULAR; INTRALESIONAL; INTRAMUSCULAR at 14:25

## 2025-03-27 RX ADMIN — PENICILLIN G 3 MILLION UNITS: 3000000 INJECTION, SOLUTION INTRAVENOUS at 19:59

## 2025-03-27 RX ADMIN — SODIUM CHLORIDE, SODIUM LACTATE, POTASSIUM CHLORIDE, CALCIUM CHLORIDE 125 ML/HR: 20; 30; 600; 310 INJECTION, SOLUTION INTRAVENOUS at 10:11

## 2025-03-27 RX ADMIN — SODIUM CHLORIDE 5 MILLION UNITS: 900 INJECTION INTRAVENOUS at 10:52

## 2025-03-27 RX ADMIN — PENICILLIN G 3 MILLION UNITS: 3000000 INJECTION, SOLUTION INTRAVENOUS at 15:18

## 2025-03-27 RX ADMIN — SODIUM CHLORIDE, SODIUM LACTATE, POTASSIUM CHLORIDE, CALCIUM CHLORIDE 125 ML/HR: 20; 30; 600; 310 INJECTION, SOLUTION INTRAVENOUS at 19:59

## 2025-03-27 NOTE — H&P
SUZANNE [facility]aco  Obstetric History and Physical     Labor      Subjective     Patient is a 29 y.o. female  currently at 34w6d, who presents from the office after cervical check performed confirming 4-5 cm dilation. She was initially checked due to report of contractions overnight. She reports that since being at L&D she has not experienced contractions. She denies vaginal bleeding or discharge. She denies leakage of fluid. Reports good FM.      Her prenatal care is complicated by short interpregnancy interval with most recent delivery 2024. Also complicated by hx of GHTN, hx of GDM. She had early DEYANIRA that resolved. NIPT low risk male.  Her previous obstetric/gynecological history is noted for is remarkable for  x2    The following portions of the patients history were reviewed and updated as appropriate: current medications, allergies, past medical history, past surgical history, past family history, past social history, and problem list .       Prenatal Information:   Maternal Prenatal Labs  Blood Type ABO Type   Date Value Ref Range Status   2025 A  Final      Rh Status RH type   Date Value Ref Range Status   2025 Positive  Final      Antibody Screen Antibody Screen   Date Value Ref Range Status   2025 Negative  Final                        Past OB History:       OB History    Para Term  AB Living   4 2 1 1 1 2   SAB IAB Ectopic Molar Multiple Live Births   1 0 0 0 0 2      # Outcome Date GA Lbr Aditya/2nd Weight Sex Type Anes PTL Lv   4 Current            3  24 36w4d  2840 g (6 lb 4.2 oz) M Vag-Spont EPI Y CATIE      Name: Leeroy RUBIO Juan Manuel      Apgar1: 8  Apgar5: 9   2 Term 20 39w1d  3050 g (6 lb 11.6 oz) F Vag-Spont EPI N CATIE      Name: EDGARD CHANEY      Apgar1: 8  Apgar5: 9   1 SAB 2018 10w0d    SAB          Past Medical History: Past Medical History:   Diagnosis Date    Anxiety     situational- states she had some chest pain earliet  today    Chlamydia 11/2019    treeated    Depression     no meds      Past Surgical History Past Surgical History:   Procedure Laterality Date    D & C WITH SUCTION  07/2018    sab      Family History: Family History   Problem Relation Age of Onset    Hypertension Father     Dementia Father     No Known Problems Mother     No Known Problems Brother       Social History:  reports that she has never smoked. She has never used smokeless tobacco.   reports that she does not currently use alcohol.   reports no history of drug use.        REVIEW OF SYSTEMS              Reports fetal movement is normal             Denies leakage of amniotic fluid.             Denies vaginal bleeding             She reports Some cramping, no contractions since intake to unit             All other systems reviewed and are negative    Objective       Vital Signs Range for the last 24 hours  Temperature: Temp:  [97.3 °F (36.3 °C)] 97.3 °F (36.3 °C)   Temp Source: Temp src: Oral   BP: BP: (135)/(80) 135/80   Pulse: Heart Rate:  [93] 93   Respirations: Resp:  [17] 17   SPO2:     O2 Amount (l/min):     O2 Devices     Weight: Weight:  [99.3 kg (219 lb)] 99.3 kg (219 lb)       Presentation:    Cervix: Exam by: Method: sterile vaginal exam performed (per Dr. Rao)   Dilation: Cervical Dilation (cm): 4-5   Effacement: Cervical Effacement: 60   Station: Fetal Station: -2        Fetal Heart Rate Assessment   Method: Fetal HR Assessment Method: external   Beats/min: Fetal HR (beats/min): 130   Baseline: Fetal HR Baseline: normal range   Variability: Fetal HR Variability: moderate (amplitude range 6 to 25 bpm)   Accels: Fetal HR Accelerations: greater than/equal to 15 bpm, lasting at least 15 seconds   Decels: Fetal HR Decelerations: absent   Tracing Category:       Uterine Assessment   Method: Method: external tocotransducer   Frequency (min): Contraction Frequency (Minutes): 2-4   Ctx Count in 10 min: Contractions in 10 Minutes: 1   Duration:      Intensity: Contraction Intensity: no contractions   Intensity by IUPC:     Resting Tone: Uterine Resting Tone: soft by palpation   Resting Tone by IUPC:     Antoni Units:       Constitutional:  Well developed, well nourished, no acute distress, well-groomed.   Respiratory:  Lungs are clear to auscultation bilaterally, normal breath sounds.   Cardiovascular:  Normal rate and rhythm, no murmurs.   Gastrointestinal:  Soft, gravid, nontender.  Uterus: Soft, nontender. Fundus appropriate for dates.  Neurologic:  Alert & oriented x 3,  no focal deficits noted.   Psychiatric:  Speech and behavior appropriate.   Extremities: no cyanosis, clubbing or edema, no evidence of DVT.        Labs:  Lab Results   Component Value Date    WBC 9.08 2025    HGB 10.6 (L) 2025    HCT 33.1 (L) 2025    MCV 77.5 (L) 2025     2025    POCGLU 79 07/10/2020    CREATININE 0.39 (L) 2025    URICACID 3.7 2025    AST 15 2025    ALT 14 2025     (H) 2025     Results from last 7 days   Lab Units 25  1011   ABO TYPING  A   RH TYPING  Positive   ANTIBODY SCREEN  Negative           Assessment & Plan       Contractions at 34 weeks gestation   Advanced Cervical Dilation      Assessment:  1.  Intrauterine pregnancy at 34w6d weeks gestation with reactive fetal status.    2.    contractions with advanced cervical dilation without SROM  3.  Obstetrical history significant for is remarkable for  x2, hx GHTN, hx GDM  4.  GBS status: Unknown    Plan:  1.Antibiotics for GBS prophylaxis and Prolonged fetal monitoring  2. SVE on unit unchanged after 2 hours of monitoring. 4-5/60/-2  3. FHT Cat I  4. No regular or laboring contractions on TOCO and no reported contractions per patient  5. GBS infusing for infection ppx  6. Will transition to APU for 24hr monitoring for labor  7. Plan BMZ for fetal lung maturity  8. Continuous toco monitoring  9. If stable and non  laboring consideration of discharge home tomorrow after second dose of BMZ  10. Plan of care has been reviewed with patient and questions answered.  11.  Risks, benefits of treatment plan have been discussed.  12.  All questions have been answered.  13. Proceed with transfer and monitoring on APU        Jayda Rao DO  3/27/2025  12:40 EDT

## 2025-03-28 PROCEDURE — 59025 FETAL NON-STRESS TEST: CPT

## 2025-03-28 NOTE — PROGRESS NOTES
"Subjective   Patient reports burning in her vagina x 1 hour.  She denies any abnormal vaginal discharge, odor, itching.  Denies any vulvar discomfort.  She is also reporting some lower back pain.    Objective  /89 (BP Location: Left arm, Patient Position: Lying)   Pulse 108   Temp 98.6 °F (37 °C) (Oral)   Resp 16   Ht 162.6 cm (64\")   Wt 99.3 kg (219 lb)   LMP 2024   SpO2 98%   Breastfeeding No   BMI 37.59 kg/m²    Speculum exam-vagina and cervix without any lesions, physiologic appearing vaginal discharge.  Cervix: 4.5/60/-3, vertex  Fetal heart rate tracins, moderate variability, positive accelerations  [place]: Irritability    Assessment and plan  Vaginal irritation likely due to multiple cervical exams.  Physical exam is unremarkable with physiologic discharge.  Offered Tylenol and heating pad for back pain and patient accepts.  Continue current management.    Nicholas Brady MD  3/27/2025  22:04 EDT    "

## 2025-03-28 NOTE — NURSING NOTE
Pt expressed wishes to go home. RN discussed with pt the benefit of continuing her hospital stay. After given time to review education and RN notified provider on call and laborist. PT decided to leave AMA. Pt educated to return to hospital should she have any changes in status. Fetal monitoring reassuring. IV removed. Pt ambulated from unit with belongings and Mother at her side @ 1458 3/27/2025

## 2025-03-30 LAB — BACTERIA SPEC AEROBE CULT: NORMAL

## 2025-03-31 NOTE — DISCHARGE SUMMARY
"Subjective   Patient reports burning in her vagina x 1 hour.  She denies any abnormal vaginal discharge, odor, itching.  Denies any vulvar discomfort.  She is also reporting some lower back pain.    Objective  /89 (BP Location: Left arm, Patient Position: Lying)   Pulse 108   Temp 98.6 °F (37 °C) (Oral)   Resp 16   Ht 162.6 cm (64\")   Wt 99.3 kg (219 lb)   LMP 2024   SpO2 98%   Breastfeeding No   BMI 37.59 kg/m²    Speculum exam-vagina and cervix without any lesions, physiologic appearing vaginal discharge.  Cervix: 4.5/60/-3, vertex  Fetal heart rate tracins, moderate variability, positive accelerations  [place]: Irritability    Assessment and plan  Vaginal irritation likely due to multiple cervical exams.  Physical exam is unremarkable with physiologic discharge.  Offered Tylenol and heating pad for back pain and patient accepts.  Discharge home    Nicholas Brady MD  3/27/2025  22:04 EDT    "

## 2025-04-01 ENCOUNTER — HOSPITAL ENCOUNTER (OUTPATIENT)
Facility: HOSPITAL | Age: 30
Discharge: HOME | End: 2025-04-02
Attending: STUDENT IN AN ORGANIZED HEALTH CARE EDUCATION/TRAINING PROGRAM | Admitting: OBSTETRICS & GYNECOLOGY
Payer: COMMERCIAL

## 2025-04-01 PROCEDURE — G0463 HOSPITAL OUTPT CLINIC VISIT: HCPCS

## 2025-04-02 VITALS
TEMPERATURE: 98.5 F | OXYGEN SATURATION: 98 % | HEART RATE: 99 BPM | HEIGHT: 64 IN | DIASTOLIC BLOOD PRESSURE: 83 MMHG | BODY MASS INDEX: 37.39 KG/M2 | SYSTOLIC BLOOD PRESSURE: 136 MMHG | WEIGHT: 219 LBS

## 2025-04-02 PROBLEM — O47.03 FALSE LABOR BEFORE 37 COMPLETED WEEKS OF GESTATION DURING PREGNANCY IN THIRD TRIMESTER, ANTEPARTUM: Status: ACTIVE | Noted: 2025-04-02

## 2025-04-02 PROCEDURE — 99213 OFFICE O/P EST LOW 20 MIN: CPT | Performed by: OBSTETRICS & GYNECOLOGY

## 2025-04-02 PROCEDURE — 59025 FETAL NON-STRESS TEST: CPT | Performed by: OBSTETRICS & GYNECOLOGY

## 2025-04-02 PROCEDURE — 59025 FETAL NON-STRESS TEST: CPT

## 2025-04-02 PROCEDURE — G0378 HOSPITAL OBSERVATION PER HR: HCPCS

## 2025-04-02 NOTE — H&P
"Winifred Chaney  1995  [id number]  [id number]    CC: contractions  HPI:  Patient is 29 y.o. female   currently at 35w5d presents with c/o worsening contractions.  Pt has been having them for the past several days, worse tonight.  Pain radiates to back.  Pt denies bleeding or leaking.  Pt says fetal movement has decreased in intensity, but normal frequency.  No recent intercourse.  PNC comp by hx  delivery    PMH:  Current meds: PNV, Fe  Illnesses: none  Surgeries: D and C  Allergies: NKDA    Past OB History:       OB History    Para Term  AB Living   4 2 1 1 1 2   SAB IAB Ectopic Molar Multiple Live Births   1 0 0 0 0 2      # Outcome Date GA Lbr Aditya/2nd Weight Sex Type Anes PTL Lv   4 Current            3  24 36w4d  2840 g (6 lb 4.2 oz) M Vag-Spont EPI Y CATIE      Name: Leeroy RUBIO Zambrano      Apgar1: 8  Apgar5: 9   2 Term 20 39w1d  3050 g (6 lb 11.6 oz) F Vag-Spont EPI N CATIE      Name: EDGARD CHANEY      Apgar1: 8  Apgar5: 9   1 SAB 2018 10w0d    SAB               SH: tob neg , EtOH neg, drugs neg      General ROS: contractions, back pain.   All other systems reviewed and are negative.      Physical Examination: General appearance - alert, well appearing, and in no distress  Vital signs - /83   Pulse 99   Temp 98.5 °F (36.9 °C) (Oral)   Ht 162.6 cm (64\")   Wt 99.3 kg (219 lb)   LMP 2024   SpO2 98%   BMI 37.59 kg/m²   HEENT: normocephalic, atraumatic,oropharynx clear, appearance of ears and nose normal  Neck - supple, no significant adenopathy, no thyromegaly  Lymphatics - no palpable lymphadenopathy in the neck or groin, no hepatosplenomegaly  Chest - clear to auscultation, no wheezes, rales or rhonchi, respiratory effort non-labored  Heart - normal rate, regular rhythm, no murmurs, rubs, clicks or gallops, no JVD, no lower extremity edema  Abdomen - soft, nontender, nondistended, no masses, no hepatosplenomegaly  no rebound " tenderness noted, bowel sounds normal  Vaginal Exam: 4/60%/-3, no blood in vault,external genitalia normal (per RN)  Extremities - no pedal edema noted, no calf tenderness  Skin -warm and dry, normal coloration and turgor, no rashes, no suspicious skin lesions noted      Fetal monitoring: indication contractions, onset 0005, offset 0045, baseline 130, mod BTB variability, multiple accels (15 X 15), no decels, occas contractions, interpretation reactive NST    Radiology     Assessment 1)IUP 35 5/7 weeks   2)false labor   3)hx  delivery    Plan 1)observe   2)home   3)keep appt tomorrow    Gustavo Vasquez MD  2025  00:44 EDT

## 2025-04-05 ENCOUNTER — HOSPITAL ENCOUNTER (OUTPATIENT)
Facility: HOSPITAL | Age: 30
Discharge: HOME | End: 2025-04-05
Attending: STUDENT IN AN ORGANIZED HEALTH CARE EDUCATION/TRAINING PROGRAM | Admitting: STUDENT IN AN ORGANIZED HEALTH CARE EDUCATION/TRAINING PROGRAM
Payer: COMMERCIAL

## 2025-04-05 VITALS
OXYGEN SATURATION: 98 % | WEIGHT: 219 LBS | TEMPERATURE: 97.7 F | DIASTOLIC BLOOD PRESSURE: 88 MMHG | RESPIRATION RATE: 17 BRPM | BODY MASS INDEX: 37.39 KG/M2 | SYSTOLIC BLOOD PRESSURE: 133 MMHG | HEART RATE: 93 BPM | HEIGHT: 64 IN

## 2025-04-05 LAB — POC AMNISURE: NEGATIVE

## 2025-04-05 PROCEDURE — 59025 FETAL NON-STRESS TEST: CPT | Performed by: OBSTETRICS & GYNECOLOGY

## 2025-04-05 PROCEDURE — 84112 EVAL AMNIOTIC FLUID PROTEIN: CPT | Performed by: OBSTETRICS & GYNECOLOGY

## 2025-04-05 PROCEDURE — 99213 OFFICE O/P EST LOW 20 MIN: CPT | Performed by: OBSTETRICS & GYNECOLOGY

## 2025-04-05 PROCEDURE — G0463 HOSPITAL OUTPT CLINIC VISIT: HCPCS

## 2025-04-05 NOTE — H&P
Carlos Enrique  Obstetric History and Physical    Chief Complaint   Patient presents with    Leaking Fluid       HPI:      Patient is a 29 y.o. female  currently at 36w1d, who presents after reporting that she fell last night on her buttocks.  She says she had no abdominal pain, cramping or more contractions.   No vaginal bleeding and +FM the entire time.   She did start to have vaginal leaking that she says has been on and off since she fell.  Denies fever or chills.   No other complaints.          The following portions of the patients history were reviewed and updated as appropriate: current medications, allergies, past medical history, past surgical history, past family history, past social history and problem list .       Prenatal Information:   Prenatal Labs  Lab Results   Component Value Date    HEPBSAG Non-Reactive 10/24/2024    ABO A 2025    RH Positive 2025    ABSCRN Negative 2025    HEPCVIRUSABY Non-Reactive 10/24/2024         External Prenatal Results       Pregnancy Outside Results - Transcribed From Office Records - See Scanned Records For Details       Test Value Date Time    ABO  A  25 1011    Rh  Positive  25 1011    Antibody Screen  Negative  25 1011       Negative  25 1002       Negative  10/24/24 1110    Varicella IgG  Reactive  10/24/24 1110    Rubella  1.29 index 10/24/24 1110    Hgb  10.6 g/dL 25 1018       10.2 g/dL 25 1002       11.8 g/dL 10/24/24 1110       11.8 g/dL 10/08/24 2226    Hct  33.1 % 25 1018       33.2 % 25 1002       37.6 % 10/24/24 1110       36.9 % 10/08/24 2226    HgB A1c   5.10 % 10/24/24 1110    1h GTT  134 mg/dL 25 1135       143 mg/dL 25 1002    3h GTT Fasting       3h GTT 1 hour       3h GTT 2 hour       3h GTT 3 hour   43 mg/dL 25 1345    Gonorrhea (discrete)  Negative  10/24/24 1110    Chlamydia (discrete)  Negative  10/24/24 1110    RPR       Syphils cascade: TP-Ab (FTA)   Non-Reactive  25 1018       Non-Reactive  25 1002       Non-Reactive  10/24/24 1110    TP-Ab  Non-Reactive  25 1018       Non-Reactive  25 1002       Non-Reactive  10/24/24 1110    TP-Ab (EIA)       TPPA       HBsAg  Non-Reactive  10/24/24 1110    Herpes Simplex Virus PCR       Herpes Simplex VIrus Culture       HIV  Non-Reactive  10/24/24 1110    Hep C RNA Quant PCR       Hep C Antibody  Non-Reactive  10/24/24 1110    AFP       NIPT       Cystic Fibrosis ([place]u)       Cystic Fibroisis        Spinal Muscular atrophy       Fragile X       Group B Strep  No Group B Streptococcus isolated  25 1655    GBS Susceptibility to Clindamycin       GBS Susceptibility to Erythromycin       Fetal Fibronectin       Genetic Testing, Maternal Blood                 Drug Screening       Test Value Date Time    Urine Drug Screen       Amphetamine Screen  Negative  10/24/24 1110    Barbiturate Screen  Negative  10/24/24 1110    Benzodiazepine Screen  Negative  10/24/24 1110    Methadone Screen  Negative  10/24/24 1110    Phencyclidine Screen  Negative  10/24/24 1110    Opiates Screen  Negative  10/24/24 1110    THC Screen  Negative  10/24/24 1110    Cocaine Screen       Propoxyphene Screen       Buprenorphine Screen  Negative  10/24/24 1110    Methamphetamine Screen       Oxycodone Screen  Negative  10/24/24 1110    Tricyclic Antidepressants Screen  Negative  10/24/24 1110              Legend    ^: Historical                              Past OB History:       OB History    Para Term  AB Living   4 2 1 1 1 2   SAB IAB Ectopic Molar Multiple Live Births   1 0 0 0 0 2      # Outcome Date GA Lbr Aditya/2nd Weight Sex Type Anes PTL Lv   4 Current            3  24 36w4d  2840 g (6 lb 4.2 oz) M Vag-Spont EPI Y CATIE      Name: Leeroy Zambrano      Apgar1: 8  Apgar5: 9   2 Term 20 39w1d  3050 g (6 lb 11.6 oz) F Vag-Spont EPI N CATIE      Name: EDGARD CHANEY      Apgar1: 8   Apgar5: 9   1 SAB 2018 10w0d    SAB          Past Medical History: Past Medical History:   Diagnosis Date    Anxiety 2013    situational- states she had some chest pain earliet today    Chlamydia 2019    treeated    Depression     no meds      Past Surgical History Past Surgical History:   Procedure Laterality Date    D & C WITH SUCTION  2018    [facility]      Family History: Family History   Problem Relation Age of Onset    Hypertension Father     Dementia Father     No Known Problems Mother     No Known Problems Brother       Social History:  reports that she has never smoked. She has never used smokeless tobacco.   reports that she does not currently use alcohol.   reports no history of drug use.        General ROS: Denies fever, HA, shortness of air, muscle weakness, and rashes    Objective       Vital Signs Range for the last 24 hours  Temperature: Temp:  [97.7 °F (36.5 °C)] 97.7 °F (36.5 °C)   Temp Source: Temp src: Oral   BP: BP: (133)/(88) 133/88   Pulse: Heart Rate:  [93] 93   Respirations: Resp:  [17] 1718   SPO2: SpO2:  [98 %] 98 %   O2 Amount (l/min):     O2 Devices     Weight: Weight:  [99.3 kg (219 lb)] 99.3 kg (219 lb)     Physical Examination: Alert and oriented X 3  Chest - Breathing is unlaboured   Heart - Regular rate   Abdomen - gravid uterus   Extremities - Non-tender bilaterally        Fetal Heart Rate Assessment   Method:     Beats/min:     Baseline:  120s    Varibility:  mod   Accels:  y   Decels:  n   Tracing Category:  1     Uterine Assessment   Method:     Frequency (min):  None    Ctx Count in 10 min:     Duration:     Intensity:     Intensity by IUPC:     Resting Tone:     Resting Tone by IUPC:     Antoni Units:       Winifred Irelandjenifer  at 36w1d with an HIRAL of 2025, by Patient Reported,  Non stress test.    Indication : vaginal discharge   Start time 1725  End time 1745  15 x 15 accelerations x 2  Yes  No decelerations  Baseline   120s  Reactive NST   Yes    Laboratory Results:     AmniSure:   NEGATIVE           Assessment:  1.  Intrauterine pregnancy at 36w1d gestation with reactive fetal status.    2.  False labour not ruptured  negative AmniSure      Plan:  1. FHTs  Reactive NST  2. No cervical change or signs and symptoms of SROM or labour  3. Reviewed labour guidelines  4. All questions have been answered.  5. Discharge home with routine OB follow-up      Ronnie Pinzon MD  4/5/2025  17:50 EDT

## 2025-04-05 NOTE — DISCHARGE INSTRUCTIONS
Return to labor oscar if any of the following occur: decreased fetal movement, vaginal bleeding, leaking of fluid

## 2025-04-05 NOTE — NURSING NOTE
Pt discharged home per MD order. RN instructed pt to return to labor oscar if any of the following occur: decreased fetal movement, vaginal bleeding, leaking of fluid. Pt verbalized understanding. Pt ambulating off unit in stable condition with all personal belongings.

## 2025-04-10 ENCOUNTER — ANESTHESIA (OUTPATIENT)
Dept: LABOR AND DELIVERY | Facility: HOSPITAL | Age: 30
End: 2025-04-10
Payer: COMMERCIAL

## 2025-04-10 ENCOUNTER — ANESTHESIA EVENT (OUTPATIENT)
Dept: LABOR AND DELIVERY | Facility: HOSPITAL | Age: 30
End: 2025-04-10
Payer: COMMERCIAL

## 2025-04-10 ENCOUNTER — HOSPITAL ENCOUNTER (INPATIENT)
Facility: HOSPITAL | Age: 30
LOS: 2 days | Discharge: HOME | End: 2025-04-12
Attending: STUDENT IN AN ORGANIZED HEALTH CARE EDUCATION/TRAINING PROGRAM | Admitting: STUDENT IN AN ORGANIZED HEALTH CARE EDUCATION/TRAINING PROGRAM
Payer: COMMERCIAL

## 2025-04-10 PROBLEM — O47.03 FALSE LABOR BEFORE 37 COMPLETED WEEKS OF GESTATION DURING PREGNANCY IN THIRD TRIMESTER, ANTEPARTUM: Status: RESOLVED | Noted: 2025-04-02 | Resolved: 2025-04-10

## 2025-04-10 PROBLEM — Z34.90 TERM PREGNANCY: Status: RESOLVED | Noted: 2025-04-10 | Resolved: 2025-04-10

## 2025-04-10 PROBLEM — Z3A.39 PREGNANCY WITH 39 COMPLETED WEEKS GESTATION: Status: RESOLVED | Noted: 2020-07-08 | Resolved: 2025-04-10

## 2025-04-10 PROBLEM — Z34.90 TERM PREGNANCY: Status: ACTIVE | Noted: 2025-04-10

## 2025-04-10 PROBLEM — O60.00 PRETERM LABOR: Status: RESOLVED | Noted: 2025-03-27 | Resolved: 2025-04-10

## 2025-04-10 LAB
ABO GROUP BLD: NORMAL
ALP SERPL-CCNC: 199 U/L (ref 39–117)
ALT SERPL W P-5'-P-CCNC: 15 U/L (ref 1–33)
AST SERPL-CCNC: 16 U/L (ref 1–32)
BILIRUB SERPL-MCNC: <0.2 MG/DL (ref 0–1.2)
BLD GP AB SCN SERPL QL: NEGATIVE
CREAT SERPL-MCNC: 0.49 MG/DL (ref 0.57–1)
DEPRECATED RDW RBC AUTO: 42.8 FL (ref 37–54)
ERYTHROCYTE [DISTWIDTH] IN BLOOD BY AUTOMATED COUNT: 15.6 % (ref 12.3–15.4)
HCT VFR BLD AUTO: 33.6 % (ref 34–46.6)
HGB BLD-MCNC: 10.7 G/DL (ref 12–15.9)
LDH SERPL-CCNC: 223 U/L (ref 135–214)
MCH RBC QN AUTO: 24.7 PG (ref 26.6–33)
MCHC RBC AUTO-ENTMCNC: 31.8 G/DL (ref 31.5–35.7)
MCV RBC AUTO: 77.4 FL (ref 79–97)
PLATELET # BLD AUTO: 190 10*3/MM3 (ref 140–450)
PMV BLD AUTO: 9.7 FL (ref 6–12)
RBC # BLD AUTO: 4.34 10*6/MM3 (ref 3.77–5.28)
RH BLD: POSITIVE
T&S EXPIRATION DATE: NORMAL
TREPONEMA PALLIDUM IGG+IGM AB [PRESENCE] IN SERUM OR PLASMA BY IMMUNOASSAY: NORMAL
URATE SERPL-MCNC: 4.5 MG/DL (ref 2.4–5.7)
WBC NRBC COR # BLD AUTO: 8.33 10*3/MM3 (ref 3.4–10.8)

## 2025-04-10 PROCEDURE — 25810000003 LACTATED RINGERS SOLUTION: Performed by: STUDENT IN AN ORGANIZED HEALTH CARE EDUCATION/TRAINING PROGRAM

## 2025-04-10 PROCEDURE — 86780 TREPONEMA PALLIDUM: CPT | Performed by: STUDENT IN AN ORGANIZED HEALTH CARE EDUCATION/TRAINING PROGRAM

## 2025-04-10 PROCEDURE — 86900 BLOOD TYPING SEROLOGIC ABO: CPT | Performed by: STUDENT IN AN ORGANIZED HEALTH CARE EDUCATION/TRAINING PROGRAM

## 2025-04-10 PROCEDURE — 86850 RBC ANTIBODY SCREEN: CPT | Performed by: STUDENT IN AN ORGANIZED HEALTH CARE EDUCATION/TRAINING PROGRAM

## 2025-04-10 PROCEDURE — 84450 TRANSFERASE (AST) (SGOT): CPT | Performed by: STUDENT IN AN ORGANIZED HEALTH CARE EDUCATION/TRAINING PROGRAM

## 2025-04-10 PROCEDURE — 86901 BLOOD TYPING SEROLOGIC RH(D): CPT | Performed by: STUDENT IN AN ORGANIZED HEALTH CARE EDUCATION/TRAINING PROGRAM

## 2025-04-10 PROCEDURE — 85027 COMPLETE CBC AUTOMATED: CPT | Performed by: STUDENT IN AN ORGANIZED HEALTH CARE EDUCATION/TRAINING PROGRAM

## 2025-04-10 PROCEDURE — 84550 ASSAY OF BLOOD/URIC ACID: CPT | Performed by: STUDENT IN AN ORGANIZED HEALTH CARE EDUCATION/TRAINING PROGRAM

## 2025-04-10 PROCEDURE — 82565 ASSAY OF CREATININE: CPT | Performed by: STUDENT IN AN ORGANIZED HEALTH CARE EDUCATION/TRAINING PROGRAM

## 2025-04-10 PROCEDURE — 84075 ASSAY ALKALINE PHOSPHATASE: CPT | Performed by: STUDENT IN AN ORGANIZED HEALTH CARE EDUCATION/TRAINING PROGRAM

## 2025-04-10 PROCEDURE — 25010000002 ROPIVACAINE PER 1 MG: Performed by: NURSE ANESTHETIST, CERTIFIED REGISTERED

## 2025-04-10 PROCEDURE — 36415 COLL VENOUS BLD VENIPUNCTURE: CPT | Performed by: STUDENT IN AN ORGANIZED HEALTH CARE EDUCATION/TRAINING PROGRAM

## 2025-04-10 PROCEDURE — 25010000002 LIDOCAINE-EPINEPHRINE (PF) 1.5 %-1:200000 SOLUTION: Performed by: NURSE ANESTHETIST, CERTIFIED REGISTERED

## 2025-04-10 PROCEDURE — 84460 ALANINE AMINO (ALT) (SGPT): CPT | Performed by: STUDENT IN AN ORGANIZED HEALTH CARE EDUCATION/TRAINING PROGRAM

## 2025-04-10 PROCEDURE — 25810000003 LACTATED RINGERS PER 1000 ML: Performed by: STUDENT IN AN ORGANIZED HEALTH CARE EDUCATION/TRAINING PROGRAM

## 2025-04-10 PROCEDURE — 82247 BILIRUBIN TOTAL: CPT | Performed by: STUDENT IN AN ORGANIZED HEALTH CARE EDUCATION/TRAINING PROGRAM

## 2025-04-10 PROCEDURE — C1755 CATHETER, INTRASPINAL: HCPCS | Performed by: ANESTHESIOLOGY

## 2025-04-10 PROCEDURE — 83615 LACTATE (LD) (LDH) ENZYME: CPT | Performed by: STUDENT IN AN ORGANIZED HEALTH CARE EDUCATION/TRAINING PROGRAM

## 2025-04-10 PROCEDURE — 25010000002 FENTANYL CITRATE (PF) 50 MCG/ML SOLUTION: Performed by: NURSE ANESTHETIST, CERTIFIED REGISTERED

## 2025-04-10 RX ORDER — OXYTOCIN/0.9 % SODIUM CHLORIDE 30/500 ML
30 PLASTIC BAG, INJECTION (ML) INTRAVENOUS ONCE
Status: DISCONTINUED | OUTPATIENT
Start: 2025-04-10 | End: 2025-04-10 | Stop reason: HOSPADM

## 2025-04-10 RX ORDER — ONDANSETRON 2 MG/ML
4 INJECTION INTRAMUSCULAR; INTRAVENOUS EVERY 6 HOURS PRN
Status: DISCONTINUED | OUTPATIENT
Start: 2025-04-10 | End: 2025-04-12 | Stop reason: HOSPADM

## 2025-04-10 RX ORDER — OXYTOCIN/0.9 % SODIUM CHLORIDE 30/500 ML
30 PLASTIC BAG, INJECTION (ML) INTRAVENOUS ONCE
Status: DISCONTINUED | OUTPATIENT
Start: 2025-04-10 | End: 2025-04-10 | Stop reason: SDUPTHER

## 2025-04-10 RX ORDER — CITRIC ACID/SODIUM CITRATE 334-500MG
30 SOLUTION, ORAL ORAL ONCE
Status: DISCONTINUED | OUTPATIENT
Start: 2025-04-10 | End: 2025-04-10 | Stop reason: HOSPADM

## 2025-04-10 RX ORDER — PROMETHAZINE HYDROCHLORIDE 12.5 MG/1
12.5 TABLET ORAL EVERY 6 HOURS PRN
Status: DISCONTINUED | OUTPATIENT
Start: 2025-04-10 | End: 2025-04-10 | Stop reason: HOSPADM

## 2025-04-10 RX ORDER — OXYTOCIN/0.9 % SODIUM CHLORIDE 30/500 ML
30 PLASTIC BAG, INJECTION (ML) INTRAVENOUS CONTINUOUS
Status: ACTIVE | OUTPATIENT
Start: 2025-04-10 | End: 2025-04-10

## 2025-04-10 RX ORDER — MISOPROSTOL 200 UG/1
TABLET ORAL
Status: COMPLETED
Start: 2025-04-10 | End: 2025-04-10

## 2025-04-10 RX ORDER — ONDANSETRON 4 MG/1
4 TABLET, ORALLY DISINTEGRATING ORAL EVERY 6 HOURS PRN
Status: DISCONTINUED | OUTPATIENT
Start: 2025-04-10 | End: 2025-04-10 | Stop reason: HOSPADM

## 2025-04-10 RX ORDER — IBUPROFEN 600 MG/1
600 TABLET, FILM COATED ORAL EVERY 6 HOURS PRN
Status: DISCONTINUED | OUTPATIENT
Start: 2025-04-10 | End: 2025-04-10 | Stop reason: HOSPADM

## 2025-04-10 RX ORDER — MISOPROSTOL 200 UG/1
800 TABLET ORAL AS NEEDED
Status: DISCONTINUED | OUTPATIENT
Start: 2025-04-10 | End: 2025-04-10 | Stop reason: HOSPADM

## 2025-04-10 RX ORDER — TERBUTALINE SULFATE 1 MG/ML
0.25 INJECTION SUBCUTANEOUS AS NEEDED
Status: DISCONTINUED | OUTPATIENT
Start: 2025-04-10 | End: 2025-04-10 | Stop reason: HOSPADM

## 2025-04-10 RX ORDER — BUTORPHANOL TARTRATE 2 MG/ML
2 INJECTION, SOLUTION INTRAMUSCULAR; INTRAVENOUS
Status: DISCONTINUED | OUTPATIENT
Start: 2025-04-10 | End: 2025-04-10 | Stop reason: HOSPADM

## 2025-04-10 RX ORDER — OXYTOCIN/0.9 % SODIUM CHLORIDE 30/500 ML
125 PLASTIC BAG, INJECTION (ML) INTRAVENOUS ONCE AS NEEDED
Status: DISCONTINUED | OUTPATIENT
Start: 2025-04-10 | End: 2025-04-12 | Stop reason: HOSPADM

## 2025-04-10 RX ORDER — HYDROCORTISONE 25 MG/G
1 CREAM TOPICAL AS NEEDED
Status: DISCONTINUED | OUTPATIENT
Start: 2025-04-10 | End: 2025-04-12 | Stop reason: HOSPADM

## 2025-04-10 RX ORDER — BISACODYL 10 MG
10 SUPPOSITORY, RECTAL RECTAL DAILY PRN
Status: DISCONTINUED | OUTPATIENT
Start: 2025-04-11 | End: 2025-04-12 | Stop reason: HOSPADM

## 2025-04-10 RX ORDER — BUTORPHANOL TARTRATE 1 MG/ML
1 INJECTION, SOLUTION INTRAMUSCULAR; INTRAVENOUS
Status: DISCONTINUED | OUTPATIENT
Start: 2025-04-10 | End: 2025-04-10 | Stop reason: HOSPADM

## 2025-04-10 RX ORDER — METHYLERGONOVINE MALEATE 0.2 MG/ML
200 INJECTION INTRAVENOUS ONCE AS NEEDED
Status: DISCONTINUED | OUTPATIENT
Start: 2025-04-10 | End: 2025-04-10 | Stop reason: HOSPADM

## 2025-04-10 RX ORDER — DOCUSATE SODIUM 100 MG/1
100 CAPSULE, LIQUID FILLED ORAL 2 TIMES DAILY
Status: DISCONTINUED | OUTPATIENT
Start: 2025-04-10 | End: 2025-04-12 | Stop reason: HOSPADM

## 2025-04-10 RX ORDER — HYDROCODONE BITARTRATE AND ACETAMINOPHEN 5; 325 MG/1; MG/1
1 TABLET ORAL EVERY 4 HOURS PRN
Status: DISCONTINUED | OUTPATIENT
Start: 2025-04-10 | End: 2025-04-12 | Stop reason: HOSPADM

## 2025-04-10 RX ORDER — SODIUM CHLORIDE 0.9 % (FLUSH) 0.9 %
10 SYRINGE (ML) INJECTION AS NEEDED
Status: DISCONTINUED | OUTPATIENT
Start: 2025-04-10 | End: 2025-04-10 | Stop reason: HOSPADM

## 2025-04-10 RX ORDER — ONDANSETRON 2 MG/ML
4 INJECTION INTRAMUSCULAR; INTRAVENOUS EVERY 6 HOURS PRN
Status: DISCONTINUED | OUTPATIENT
Start: 2025-04-10 | End: 2025-04-10 | Stop reason: HOSPADM

## 2025-04-10 RX ORDER — SODIUM CHLORIDE, SODIUM LACTATE, POTASSIUM CHLORIDE, CALCIUM CHLORIDE 600; 310; 30; 20 MG/100ML; MG/100ML; MG/100ML; MG/100ML
125 INJECTION, SOLUTION INTRAVENOUS CONTINUOUS
Status: DISCONTINUED | OUTPATIENT
Start: 2025-04-10 | End: 2025-04-10

## 2025-04-10 RX ORDER — OXYCODONE AND ACETAMINOPHEN 10; 325 MG/1; MG/1
1 TABLET ORAL EVERY 4 HOURS PRN
Refills: 0 | Status: DISCONTINUED | OUTPATIENT
Start: 2025-04-10 | End: 2025-04-10 | Stop reason: HOSPADM

## 2025-04-10 RX ORDER — ONDANSETRON 2 MG/ML
4 INJECTION INTRAMUSCULAR; INTRAVENOUS EVERY 6 HOURS PRN
Status: DISCONTINUED | OUTPATIENT
Start: 2025-04-10 | End: 2025-04-10 | Stop reason: SDUPTHER

## 2025-04-10 RX ORDER — OXYTOCIN/0.9 % SODIUM CHLORIDE 30/500 ML
2-20 PLASTIC BAG, INJECTION (ML) INTRAVENOUS
Status: DISCONTINUED | OUTPATIENT
Start: 2025-04-10 | End: 2025-04-10

## 2025-04-10 RX ORDER — ACETAMINOPHEN 325 MG/1
650 TABLET ORAL EVERY 4 HOURS PRN
Status: DISCONTINUED | OUTPATIENT
Start: 2025-04-10 | End: 2025-04-10 | Stop reason: SDUPTHER

## 2025-04-10 RX ORDER — PROMETHAZINE HYDROCHLORIDE 12.5 MG/1
12.5 SUPPOSITORY RECTAL EVERY 6 HOURS PRN
Status: DISCONTINUED | OUTPATIENT
Start: 2025-04-10 | End: 2025-04-10 | Stop reason: HOSPADM

## 2025-04-10 RX ORDER — CALCIUM CARBONATE 500 MG/1
2 TABLET, CHEWABLE ORAL 3 TIMES DAILY PRN
Status: DISCONTINUED | OUTPATIENT
Start: 2025-04-10 | End: 2025-04-10 | Stop reason: HOSPADM

## 2025-04-10 RX ORDER — ROPIVACAINE HYDROCHLORIDE 2 MG/ML
14 INJECTION, SOLUTION EPIDURAL; INFILTRATION; PERINEURAL CONTINUOUS
Status: DISCONTINUED | OUTPATIENT
Start: 2025-04-10 | End: 2025-04-10

## 2025-04-10 RX ORDER — MISOPROSTOL 200 UG/1
800 TABLET ORAL AS NEEDED
Status: DISCONTINUED | OUTPATIENT
Start: 2025-04-10 | End: 2025-04-10 | Stop reason: SDUPTHER

## 2025-04-10 RX ORDER — CARBOPROST TROMETHAMINE 250 UG/ML
250 INJECTION, SOLUTION INTRAMUSCULAR AS NEEDED
Status: DISCONTINUED | OUTPATIENT
Start: 2025-04-10 | End: 2025-04-10 | Stop reason: SDUPTHER

## 2025-04-10 RX ORDER — LIDOCAINE HYDROCHLORIDE 10 MG/ML
0.5 INJECTION, SOLUTION EPIDURAL; INFILTRATION; INTRACAUDAL; PERINEURAL ONCE AS NEEDED
Status: DISCONTINUED | OUTPATIENT
Start: 2025-04-10 | End: 2025-04-10 | Stop reason: HOSPADM

## 2025-04-10 RX ORDER — OXYCODONE AND ACETAMINOPHEN 5; 325 MG/1; MG/1
1 TABLET ORAL EVERY 4 HOURS PRN
Refills: 0 | Status: DISCONTINUED | OUTPATIENT
Start: 2025-04-10 | End: 2025-04-10 | Stop reason: HOSPADM

## 2025-04-10 RX ORDER — IBUPROFEN 600 MG/1
600 TABLET, FILM COATED ORAL EVERY 6 HOURS PRN
Status: DISCONTINUED | OUTPATIENT
Start: 2025-04-10 | End: 2025-04-12 | Stop reason: HOSPADM

## 2025-04-10 RX ORDER — OXYTOCIN/0.9 % SODIUM CHLORIDE 30/500 ML
30 PLASTIC BAG, INJECTION (ML) INTRAVENOUS CONTINUOUS
Status: DISCONTINUED | OUTPATIENT
Start: 2025-04-10 | End: 2025-04-10 | Stop reason: SDUPTHER

## 2025-04-10 RX ORDER — ACETAMINOPHEN 325 MG/1
650 TABLET ORAL EVERY 4 HOURS PRN
Status: DISCONTINUED | OUTPATIENT
Start: 2025-04-10 | End: 2025-04-10 | Stop reason: HOSPADM

## 2025-04-10 RX ORDER — HYDROCODONE BITARTRATE AND ACETAMINOPHEN 10; 325 MG/1; MG/1
1 TABLET ORAL EVERY 4 HOURS PRN
Status: DISCONTINUED | OUTPATIENT
Start: 2025-04-10 | End: 2025-04-12 | Stop reason: HOSPADM

## 2025-04-10 RX ORDER — FAMOTIDINE 10 MG/ML
20 INJECTION, SOLUTION INTRAVENOUS ONCE AS NEEDED
Status: DISCONTINUED | OUTPATIENT
Start: 2025-04-10 | End: 2025-04-10 | Stop reason: HOSPADM

## 2025-04-10 RX ORDER — DIPHENHYDRAMINE HYDROCHLORIDE 50 MG/ML
12.5 INJECTION, SOLUTION INTRAMUSCULAR; INTRAVENOUS EVERY 8 HOURS PRN
Status: DISCONTINUED | OUTPATIENT
Start: 2025-04-10 | End: 2025-04-10 | Stop reason: HOSPADM

## 2025-04-10 RX ORDER — ONDANSETRON 4 MG/1
4 TABLET, ORALLY DISINTEGRATING ORAL EVERY 6 HOURS PRN
Status: DISCONTINUED | OUTPATIENT
Start: 2025-04-10 | End: 2025-04-10 | Stop reason: SDUPTHER

## 2025-04-10 RX ORDER — FENTANYL CITRATE 50 UG/ML
INJECTION, SOLUTION INTRAMUSCULAR; INTRAVENOUS AS NEEDED
Status: DISCONTINUED | OUTPATIENT
Start: 2025-04-10 | End: 2025-04-10 | Stop reason: SURG

## 2025-04-10 RX ORDER — METHYLERGONOVINE MALEATE 0.2 MG/ML
200 INJECTION INTRAVENOUS ONCE AS NEEDED
Status: DISCONTINUED | OUTPATIENT
Start: 2025-04-10 | End: 2025-04-10 | Stop reason: SDUPTHER

## 2025-04-10 RX ORDER — ACETAMINOPHEN 325 MG/1
650 TABLET ORAL EVERY 6 HOURS PRN
Status: DISCONTINUED | OUTPATIENT
Start: 2025-04-10 | End: 2025-04-12 | Stop reason: HOSPADM

## 2025-04-10 RX ORDER — MAGNESIUM CARB/ALUMINUM HYDROX 105-160MG
30 TABLET,CHEWABLE ORAL ONCE
Status: DISCONTINUED | OUTPATIENT
Start: 2025-04-10 | End: 2025-04-10 | Stop reason: HOSPADM

## 2025-04-10 RX ORDER — SODIUM CHLORIDE 0.9 % (FLUSH) 0.9 %
10 SYRINGE (ML) INJECTION EVERY 12 HOURS SCHEDULED
Status: DISCONTINUED | OUTPATIENT
Start: 2025-04-10 | End: 2025-04-10 | Stop reason: HOSPADM

## 2025-04-10 RX ORDER — SODIUM CHLORIDE 0.9 % (FLUSH) 0.9 %
1-10 SYRINGE (ML) INJECTION AS NEEDED
Status: DISCONTINUED | OUTPATIENT
Start: 2025-04-10 | End: 2025-04-12 | Stop reason: HOSPADM

## 2025-04-10 RX ORDER — EPHEDRINE SULFATE 5 MG/ML
INJECTION INTRAVENOUS
Status: DISCONTINUED
Start: 2025-04-10 | End: 2025-04-12 | Stop reason: HOSPADM

## 2025-04-10 RX ORDER — CARBOPROST TROMETHAMINE 250 UG/ML
250 INJECTION, SOLUTION INTRAMUSCULAR AS NEEDED
Status: DISCONTINUED | OUTPATIENT
Start: 2025-04-10 | End: 2025-04-10 | Stop reason: HOSPADM

## 2025-04-10 RX ORDER — PRENATAL VIT/IRON FUM/FOLIC AC 27MG-0.8MG
1 TABLET ORAL DAILY
Status: DISCONTINUED | OUTPATIENT
Start: 2025-04-11 | End: 2025-04-12 | Stop reason: HOSPADM

## 2025-04-10 RX ORDER — OXYTOCIN/0.9 % SODIUM CHLORIDE 30/500 ML
PLASTIC BAG, INJECTION (ML) INTRAVENOUS
Status: DISCONTINUED
Start: 2025-04-10 | End: 2025-04-12 | Stop reason: HOSPADM

## 2025-04-10 RX ORDER — LIDOCAINE HYDROCHLORIDE AND EPINEPHRINE 15; 5 MG/ML; UG/ML
INJECTION, SOLUTION EPIDURAL AS NEEDED
Status: DISCONTINUED | OUTPATIENT
Start: 2025-04-10 | End: 2025-04-10 | Stop reason: SURG

## 2025-04-10 RX ORDER — EPHEDRINE SULFATE 5 MG/ML
10 INJECTION INTRAVENOUS
Status: DISCONTINUED | OUTPATIENT
Start: 2025-04-10 | End: 2025-04-10 | Stop reason: HOSPADM

## 2025-04-10 RX ORDER — SODIUM CHLORIDE 9 MG/ML
40 INJECTION, SOLUTION INTRAVENOUS AS NEEDED
Status: DISCONTINUED | OUTPATIENT
Start: 2025-04-10 | End: 2025-04-10 | Stop reason: HOSPADM

## 2025-04-10 RX ADMIN — ACETAMINOPHEN 650 MG: 325 TABLET, FILM COATED ORAL at 22:30

## 2025-04-10 RX ADMIN — WITCH HAZEL: 500 SOLUTION RECTAL; TOPICAL at 22:31

## 2025-04-10 RX ADMIN — ROPIVACAINE HYDROCHLORIDE 10 ML: 5 INJECTION, SOLUTION EPIDURAL; INFILTRATION; PERINEURAL at 14:08

## 2025-04-10 RX ADMIN — SODIUM CHLORIDE, SODIUM LACTATE, POTASSIUM CHLORIDE, CALCIUM CHLORIDE 1000 ML: 20; 30; 600; 310 INJECTION, SOLUTION INTRAVENOUS at 13:35

## 2025-04-10 RX ADMIN — MISOPROSTOL 800 MCG: 200 TABLET ORAL at 19:19

## 2025-04-10 RX ADMIN — SODIUM CHLORIDE, SODIUM LACTATE, POTASSIUM CHLORIDE, CALCIUM CHLORIDE 125 ML/HR: 20; 30; 600; 310 INJECTION, SOLUTION INTRAVENOUS at 12:40

## 2025-04-10 RX ADMIN — FENTANYL CITRATE 100 MCG: 50 INJECTION, SOLUTION INTRAMUSCULAR; INTRAVENOUS at 14:06

## 2025-04-10 RX ADMIN — Medication 2 MILLI-UNITS/MIN: at 15:45

## 2025-04-10 RX ADMIN — LIDOCAINE HYDROCHLORIDE AND EPINEPHRINE 3 ML: 15; 5 INJECTION, SOLUTION EPIDURAL at 14:04

## 2025-04-10 RX ADMIN — ROPIVACAINE HYDROCHLORIDE 14 ML/HR: 2 INJECTION, SOLUTION EPIDURAL; INFILTRATION at 14:11

## 2025-04-10 RX ADMIN — Medication: at 22:31

## 2025-04-10 RX ADMIN — DOCUSATE SODIUM 100 MG: 100 CAPSULE, LIQUID FILLED ORAL at 22:30

## 2025-04-10 NOTE — ANESTHESIA PREPROCEDURE EVALUATION
Anesthesia Evaluation     Patient summary reviewed and Nursing notes reviewed   NPO Solid Status: > 2 hours  NPO Liquid Status: > 2 hours           Airway   Dental      Pulmonary - negative pulmonary ROS   Cardiovascular - negative cardio ROS        Neuro/Psych  (+) psychiatric history Anxiety and Depression  GI/Hepatic/Renal/Endo    (+) obesity    Musculoskeletal (-) negative ROS    Abdominal    Substance History - negative use     OB/GYN    (+) Pregnant        Other                    Anesthesia Plan    ASA 2     epidural       Anesthetic plan, risks, benefits, and alternatives have been provided, discussed and informed consent has been obtained with: patient.    CODE STATUS:    Code Status (Patient has no pulse and is not breathing): CPR (Attempt to Resuscitate)  Medical Interventions (Patient has pulse or is breathing): Full Support  Level Of Support Discussed With: Patient

## 2025-04-10 NOTE — PROGRESS NOTES
Carlos Enrique Ireland  : 1995  MRN: [id number]  CSN: [id number]    Labor progress note    Subjective   She is comfortable with the epidural.     Objective   Min/max vitals past 24 hours:  Temp  Min: 97.6 °F (36.4 °C)  Max: 97.8 °F (36.6 °C)   BP  Min: 118/52  Max: 157/94   Pulse  Min: 80  Max: 131   Resp  Min: 16  Max: 18        FHT's: reassuring and category 1   Cervix: was checked (by me): 5-6 cm / 80 % / -2   Contractions: irregular - external monitors used        Assessment   IUP at 36w6d  Progressing in labor  Fetal status reassuring  Borderline BP  GBS negative     Plan   AROM - clear fluid seen     Radha Oglesby CNM  4/10/2025  14:35 EDT         Dose Increase/Decrease #2: same dose

## 2025-04-10 NOTE — H&P
"[address] [place] [address]  Obstetric History and Physical           Subjective     Patient is a 29 y.o. female  currently at 36w6d, who presents for early labor and borderline BPs with a history of GHTN and GDM  with favorable cervix. She voices good fetal movement. She denies vaginal bleeding and leaking of fluid. She plans epidural for labor and birth.     Her prenatal care is complicated by advanced dilation and GHTN.  Her previous obstetric/gynecological history is remarkable for PTB, GHTN, and GDM.    The following portions of the patients history were reviewed and updated as appropriate: current medications, allergies, past medical history, past surgical history, past family history, past social history, and problem list .       Prenatal Information:   Maternal Prenatal Labs  Blood Type ABO Type   Date Value Ref Range Status   04/10/2025 A  Final      Rh Status RH type   Date Value Ref Range Status   04/10/2025 Positive  Final      Antibody Screen Antibody Screen   Date Value Ref Range Status   04/10/2025 Negative  Final      Gonnorhea No results found for: \"GCCX\"   Chlamydia No results found for: \"CLAMYDCU\"   RPR No results found for: \"RPR\"   Syphilis Antibody No results found for: \"SYPHILIS\"   Rubella No results found for: \"RUBELLAIGGIN\"   Hepatitis B Surface Antigen No results found for: \"HEPBSAG\"   HIV-1 Antibody No results found for: \"LABHIV1\"   Hepatitis C Antibody No results found for: \"HEPCAB\"   Rapid Urin Drug Screen No results found for: \"AMPMETHU\", \"BARBITSCNUR\", \"LABBENZSCN\", \"LABMETHSCN\", \"LABOPIASCN\", \"THCURSCR\", \"COCAINEUR\", \"AMPHETSCREEN\", \"PROPOXSCN\", \"BUPRENORSCNU\", \"METAMPSCNUR\", \"OXYCODONESCN\", \"TRICYCLICSCN\"   Group B Strep Culture Negative                 Past OB History:       OB History    Para Term  AB Living   4 2 1 1 1 2   SAB IAB Ectopic Molar Multiple Live Births   1 0 0 0 0 2      # Outcome Date GA Lbr Aditya/2nd Weight Sex Type Anes PTL Lv   4 Current            3  " 04/20/24 36w4d  2840 g (6 lb 4.2 oz) M Vag-Spont EPI Y CATIE      Name: Leeroy Zambrano      Apgar1: 8  Apgar5: 9   2 Term 07/09/20 39w1d  3050 g (6 lb 11.6 oz) F Vag-Spont EPI N CATIE      Name: EDGARD CHANEY      Apgar1: 8  Apgar5: 9   1 SAB 07/2018 10w0d    SAB          Past Medical History: Past Medical History:   Diagnosis Date    Anxiety 2013    situational- states she had some chest pain earliet today    Chlamydia 11/2019    treeated    Depression     no meds    Gestational diabetes     with first pregnancy      Past Surgical History Past Surgical History:   Procedure Laterality Date    D & C WITH SUCTION  07/2018    sab      Family History: Family History   Problem Relation Age of Onset    Hypertension Father     Dementia Father     No Known Problems Mother     No Known Problems Brother       Social History:  reports that she has never smoked. She has never used smokeless tobacco.   reports that she does not currently use alcohol.   reports no history of drug use.        REVIEW OF SYSTEMS             Reports fetal movement is normal             Denies leakage of amniotic fluid.             Denies vaginal bleeding             She reports Irregular contractions, intensity mild             All other systems reviewed and are negative    Objective       Vital Signs Range for the last 24 hours  Temperature: Temp:  [97.6 °F (36.4 °C)] 97.6 °F (36.4 °C)   Temp Source:     BP: BP: (133-139)/(89-96) 139/96   Pulse: Heart Rate:  [100-119] 100   Respirations:     SPO2: SpO2:  [97 %-98 %] 98 %   O2 Amount (l/min):     O2 Devices     Weight:         Presentation: vertex   Cervix: Exam by:  Dr Rao   Dilation:  5   Effacement: Cervical Effacement: 70   Station:  -2       Fetal Heart Rate Assessment   Method:  external   Beats/min:     Baseline:  145   Varibility:  moderate   Accels:  present   Decels:  absent   Tracing Category:  1    NST: REACTIVE     Uterine Assessment   Method:  external   Frequency (min):     Ctx  Count in 10 min:     Duration:     Intensity:  mild   Intensity by IUPC:     Resting Tone:  Abd soft by palpation   Resting Tone by IUPC:     Antoni Units:             Constitutional:  Well developed, well nourished, no acute distress, well-groomed.   Respiratory:  Resp e/e/u.   Cardiovascular:  Normal rate and rhythm, no murmurs.   Gastrointestinal:  Soft, gravid, nontender.  Uterus: Soft, nontender. Fundus appropriate for dates.  Neurologic:  Alert & oriented x 3,  no focal deficits noted.   Psychiatric:  Speech and behavior appropriate.   Extremities: no cyanosis, clubbing or edema, no evidence of DVT.        Labs:  Lab Results   Component Value Date    WBC 8.33 04/10/2025    HGB 10.7 (L) 04/10/2025    HCT 33.6 (L) 04/10/2025    MCV 77.4 (L) 04/10/2025     04/10/2025    POCGLU 79 07/10/2020    CREATININE 0.49 (L) 04/10/2025    URICACID 4.5 04/10/2025    AST 16 04/10/2025    ALT 15 04/10/2025     (H) 04/10/2025     Results from last 7 days   Lab Units 04/10/25  1102   ABO TYPING  A   RH TYPING  Positive   ANTIBODY SCREEN  Negative           Assessment & Plan      Labor  GHTN        Assessment:  1.  Intrauterine pregnancy at 36w6d weeks gestation with reactive fetal status.    2.   Early labor     3.  Obstetrical history is remarkable for GHTN and GDM in previous pregnancy (failed 1h and passed 3h GTT this pregnancy).  4.  GBS status: Negative    Plan:  Observe for labor.  Plan AROM, oxytocin if needed.  Magnesium w/ severe range BPs or symptoms. Not indicated currently  2. Plan of care has been reviewed with patient and questions answered.  3.  Risks, benefits of treatment plan have been discussed.  4.  All questions have been answered.        Radha Oglesby CNM  4/10/2025  12:33 EDT      Electronically signed by Ashanti Medina MD, 04/10/25, 5:16 PM EDT.

## 2025-04-10 NOTE — ANESTHESIA PROCEDURE NOTES
Labor Epidural      Patient reassessed immediately prior to procedure    Patient location during procedure: OB  Performed By  CRNA/CAA: Vonda Marie CRNA  Preanesthetic Checklist  Completed: patient identified, IV checked, risks and benefits discussed, surgical consent, monitors and equipment checked, pre-op evaluation and timeout performed  Prep:  Pt Position:sitting  Sterile Tech:cap, gloves, mask and sterile barrier  Prep:DuraPrep  Monitoring:blood pressure monitoring  Epidural Block Procedure:  Approach:midline  Guidance:palpation technique  Location:L4-L5  Needle Type:Tuohy  Needle Gauge:17 G  Loss of Resistance Medium: saline  Loss of Resistance: 5cm  Cath Depth at skin:12 cm  Paresthesia: none  Aspiration:negative  Test Dose:negative  Number of Attempts: 1  Post Assessment:  Dressing:occlusive dressing applied and secured with tape  Pt Tolerance:patient tolerated the procedure well with no apparent complications  Complications:no

## 2025-04-10 NOTE — L&D DELIVERY NOTE
[facility]   Vaginal Delivery Note    Patient Name: Winifred Ireland  : 1995  MRN: [id number]    Date of Delivery: 4/10/2025    Diagnosis     Pre & Post-Delivery:  Intrauterine pregnancy at 36w6d  Labor status: Spontaneous Onset of Labor     (normal spontaneous vaginal delivery)             Problem List    Transfer to Postpartum     Review the Delivery Report for details.     Delivery     Delivery: Vaginal, Spontaneous    YOB: 2024   Time of Birth:  Gestational Age 7:09 PM  36w6d     Anesthesia: Epidural    Delivering clinician: Radha Oglesby   Forceps?   No   Vacuum? No    Shoulder dystocia present: No        Delivery narrative:  Patient at 36w6d pushed to deliver a viable male infant over an intact perineum with epidural anesthesia. Infant's head delivered and a loose nuchal x1 was found and easily reduced over fetal head. The anterior shoulder and body delivered atraumatically. Vigorous infant was placed on mom's abdomen where the cord was milked x4, clamped x2 and cut by FOB after a 1-minute delay. Infant was taken to the warmer for assessment by the waiting DRT. Placenta delivered spontaneously and appeared to be intact. Perineum was inspected and found to be intact. Boggy uterus became firm with IV oxytocin bolus and bimanual massage with clots expressed. Cytotec 800mcg was placed rectally. EBL 250ml. Mother and infant stable, bonding.       Infant     Findings: male infant     Infant observations: Weight: 2970 g (6 lb 8.8 oz)  Length: 18.5 in  Observations/Comments:        Apgars: 8  @ 1 minute /    9  @ 5 minutes   Infant Name: Vaibhav     Placenta & Cord         Placenta delivered  Spontaneous at   4/10/2025  7:13 PM    Cord: 3 vessels present.   Nuchal Cord?  yes; Number of nuchal loops present:  1   Cord blood obtained: Yes   Cord gases obtained:  No             Repair     Episiotomy: None        Lacerations: No   Estimated Blood Loss:       Quantitative Blood Loss:  Quantitative Blood Loss (mL): 200 mL (04/10/25 1914)  QBL from VAG DEL: 50 (04/10/25 1924)     Complications     none    Disposition     Mother to Mother Baby/Postpartum  in stable condition currently.  Baby to remains with mom  in stable condition currently.    Radha Oglesby CNM  04/10/25  19:31 EDT

## 2025-04-11 LAB
BASOPHILS # BLD AUTO: 0.04 10*3/MM3 (ref 0–0.2)
BASOPHILS NFR BLD AUTO: 0.4 % (ref 0–1.5)
DEPRECATED RDW RBC AUTO: 45.5 FL (ref 37–54)
EOSINOPHIL # BLD AUTO: 0.12 10*3/MM3 (ref 0–0.4)
EOSINOPHIL NFR BLD AUTO: 1.2 % (ref 0.3–6.2)
ERYTHROCYTE [DISTWIDTH] IN BLOOD BY AUTOMATED COUNT: 15.9 % (ref 12.3–15.4)
HCT VFR BLD AUTO: 34.3 % (ref 34–46.6)
HGB BLD-MCNC: 10.6 G/DL (ref 12–15.9)
IMM GRANULOCYTES # BLD AUTO: 0.07 10*3/MM3 (ref 0–0.05)
IMM GRANULOCYTES NFR BLD AUTO: 0.7 % (ref 0–0.5)
LYMPHOCYTES # BLD AUTO: 2.12 10*3/MM3 (ref 0.7–3.1)
LYMPHOCYTES NFR BLD AUTO: 21 % (ref 19.6–45.3)
MCH RBC QN AUTO: 24.8 PG (ref 26.6–33)
MCHC RBC AUTO-ENTMCNC: 30.9 G/DL (ref 31.5–35.7)
MCV RBC AUTO: 80.1 FL (ref 79–97)
MONOCYTES # BLD AUTO: 0.51 10*3/MM3 (ref 0.1–0.9)
MONOCYTES NFR BLD AUTO: 5 % (ref 5–12)
NEUTROPHILS NFR BLD AUTO: 7.24 10*3/MM3 (ref 1.7–7)
NEUTROPHILS NFR BLD AUTO: 71.7 % (ref 42.7–76)
NRBC BLD AUTO-RTO: 0 /100 WBC (ref 0–0.2)
PLATELET # BLD AUTO: 186 10*3/MM3 (ref 140–450)
PMV BLD AUTO: 10.2 FL (ref 6–12)
RBC # BLD AUTO: 4.28 10*6/MM3 (ref 3.77–5.28)
WBC NRBC COR # BLD AUTO: 10.1 10*3/MM3 (ref 3.4–10.8)

## 2025-04-11 PROCEDURE — 85025 COMPLETE CBC W/AUTO DIFF WBC: CPT | Performed by: ADVANCED PRACTICE MIDWIFE

## 2025-04-11 RX ADMIN — DOCUSATE SODIUM 100 MG: 100 CAPSULE, LIQUID FILLED ORAL at 20:46

## 2025-04-11 RX ADMIN — IBUPROFEN 600 MG: 600 TABLET ORAL at 18:39

## 2025-04-11 RX ADMIN — IBUPROFEN 600 MG: 600 TABLET ORAL at 06:08

## 2025-04-11 RX ADMIN — ACETAMINOPHEN 650 MG: 325 TABLET, FILM COATED ORAL at 07:26

## 2025-04-11 RX ADMIN — DOCUSATE SODIUM 100 MG: 100 CAPSULE, LIQUID FILLED ORAL at 07:26

## 2025-04-11 RX ADMIN — IBUPROFEN 600 MG: 600 TABLET ORAL at 12:56

## 2025-04-11 RX ADMIN — PRENATAL VITAMINS-IRON FUMARATE 27 MG IRON-FOLIC ACID 0.8 MG TABLET 1 TABLET: at 07:26

## 2025-04-11 NOTE — PROGRESS NOTES
[facility]  Obstetric Progress Note    Chief Complaint: PPD #1    Subjective     Winifred doing well. Pain is well controlled. Reports light lochia without passage of major clots. Ambulating. Tolerating PO intake. Voiding without difficulty or dysuria. Reports flatus.   Bottle feeding. Male infant doing well. Reports good mood.   No further complaints at this time.      Objective     Vital Signs Range for the last 24 hours  Temp:  [97.5 °F (36.4 °C)-98.7 °F (37.1 °C)] 98.7 °F (37.1 °C)   BP: (102-157)/(52-99) 130/70   Heart Rate:  [] 80   Resp:  [15-22] 16   SpO2:  [97 %-100 %] 100 %       Device (Oxygen Therapy): room air       Intake/Output last 24 hours:      Intake/Output Summary (Last 24 hours) at 4/11/2025 0940  Last data filed at 4/10/2025 1924  Gross per 24 hour   Intake --   Output 250 ml   Net -250 ml       Intake/Output this shift:    No intake/output data recorded.    Physical Exam:  General: A&Ox3. No acute distress.    HEENT Normocephalic, atraumatic    Heart RRR   Lungs CTAB, unlabored breathing   Abdomen Soft, non tender, negative for guarding and rebound   Extremities Exam of extremities: peripheral pulses normal, no pedal edema, no clubbing or cyanosis   Fundus Firm, midline, below umbilicus       Laboratory Results:  Lab Results   Component Value Date    WBC 8.33 04/10/2025    HGB 10.7 (L) 04/10/2025    HCT 33.6 (L) 04/10/2025    MCV 77.4 (L) 04/10/2025     04/10/2025    URICACID 4.5 04/10/2025    AST 16 04/10/2025    ALT 15 04/10/2025    HEPBSAG Non-Reactive 10/24/2024         Assessment  PPD# 1 s/p vaginal delivery    Plan  Routine postpartum care.  VSS.  Serial lochia, fundal height checks appropriate. Continue serially.   PP Hgb ordered.  Pain well controled with tylenol and ibuprofen PRN.  Encourage PO hydration, ambulation, IS.  Male infant doing well. Desires circ, peds HP not yet completed  Bottlefeeding. Lactation available for consult.     Dispo  Plan for discharge home  tomorrow pending patient and infant status.        This note has been electronically signed.    Jayda Rao DO  09:40 EDT  April 11, 2025

## 2025-04-11 NOTE — ANESTHESIA POSTPROCEDURE EVALUATION
Patient: Winifred Ireland    Procedure Summary       Date: 04/10/25 Room / Location:     Anesthesia Start: 1350 Anesthesia Stop: 1913    Procedure: LABOR ANALGESIA Diagnosis:     Scheduled Providers:  Provider: Alfred Lora DO    Anesthesia Type: epidural ASA Status: 2            Anesthesia Type: epidural    Vitals  Vitals Value Taken Time   /82 04/11/25 08:48   Temp 97.9 °F (36.6 °C) 04/11/25 08:48   Pulse 92 04/11/25 08:48   Resp 20 04/11/25 08:48   SpO2 100 % 04/10/25 19:29           Post Anesthesia Care and Evaluation    Patient location during evaluation: bedside  Patient participation: complete - patient participated  Level of consciousness: awake and alert  Pain management: adequate    Airway patency: patent  Anesthetic complications: No anesthetic complications    Cardiovascular status: acceptable  Respiratory status: acceptable  Hydration status: acceptable  Post Neuraxial Block status: Motor and sensory function returned to baseline and No signs or symptoms of PDPH

## 2025-04-12 VITALS
RESPIRATION RATE: 16 BRPM | SYSTOLIC BLOOD PRESSURE: 138 MMHG | OXYGEN SATURATION: 100 % | HEART RATE: 90 BPM | DIASTOLIC BLOOD PRESSURE: 65 MMHG | TEMPERATURE: 98 F

## 2025-04-12 RX ORDER — PSEUDOEPHEDRINE HCL 30 MG
100 TABLET ORAL 2 TIMES DAILY PRN
Qty: 60 CAPSULE | Refills: 1 | Status: SHIPPED | OUTPATIENT
Start: 2025-04-12

## 2025-04-12 RX ORDER — IBUPROFEN 600 MG/1
600 TABLET, FILM COATED ORAL EVERY 6 HOURS PRN
Qty: 60 TABLET | Refills: 1 | Status: SHIPPED | OUTPATIENT
Start: 2025-04-12

## 2025-04-12 RX ADMIN — PRENATAL VITAMINS-IRON FUMARATE 27 MG IRON-FOLIC ACID 0.8 MG TABLET 1 TABLET: at 08:08

## 2025-04-12 RX ADMIN — IBUPROFEN 600 MG: 600 TABLET ORAL at 08:08

## 2025-04-12 RX ADMIN — IBUPROFEN 600 MG: 600 TABLET ORAL at 01:17

## 2025-04-12 RX ADMIN — DOCUSATE SODIUM 100 MG: 100 CAPSULE, LIQUID FILLED ORAL at 08:08

## 2025-04-12 NOTE — DISCHARGE SUMMARY
[facility]  Vaginal delivery discharge summary      Patient: Winifred Ireland      MR#:[id number]  Admission  Diagnosis: Present on Admission:  **None**      Discharge Diagnosis: Active and Resolved Problems  Active Hospital Problems    Diagnosis  POA     (normal spontaneous vaginal delivery) [O80]  Not Applicable      Resolved Hospital Problems    Diagnosis Date Resolved POA    **Term pregnancy [Z34.90] 04/10/2025 Not Applicable            Date of Admission: 4/10/2025  Date of Discharge:  2025    Procedures:  Vaginal, Spontaneous    4/10/2025   7:09 PM     Service:  Obstetrics    Hospital Course:  Patient underwent vaginal delivery and remained in the hospital for 2 days.  During that time she remained afebrile and hemodynamically stable.  On the day of discharge, she was eating, ambulating and voiding without difficulty.  She is formula feeding.     Labs:    Lab Results   Component Value Date    WBC 10.10 2025    HGB 10.6 (L) 2025    HCT 34.3 2025    MCV 80.1 2025     2025    POCGLU 79 07/10/2020    CREATININE 0.49 (L) 04/10/2025    URICACID 4.5 04/10/2025    AST 16 04/10/2025    ALT 15 04/10/2025     (H) 04/10/2025     Results from last 7 days   Lab Units 04/10/25  1102   ABO TYPING  A   RH TYPING  Positive   ANTIBODY SCREEN  Negative            Discharge Medications        New Medications        Instructions Start Date   docusate sodium 100 MG capsule   100 mg, Oral, 2 Times Daily PRN      ibuprofen 600 MG tablet  Commonly known as: ADVIL,MOTRIN   600 mg, Oral, Every 6 Hours PRN             Continue These Medications        Instructions Start Date   Prenatal 27-1 27-1 MG tablet tablet   1 tablet, Daily             Stop These Medications      ferrous sulfate 325 (65 FE) MG tablet              Discharge Disposition:  To Home    Discharge Condition:  Stable    Discharge Diet: Regular    Activity at Discharge: Pelvic rest    Follow-up Appointments  Follow up  with [facility] in 6 weeks.     Radha Oglesby CNM  04/12/25  08:23 EDT

## 2025-04-12 NOTE — PLAN OF CARE
Problem: Adult Inpatient Plan of Care  Goal: Plan of Care Review  Outcome: Met  Goal: Patient-Specific Goal (Individualized)  Outcome: Met  Goal: Absence of Hospital-Acquired Illness or Injury  Outcome: Met  Intervention: Identify and Manage Fall Risk  Recent Flowsheet Documentation  Taken 4/12/2025 0938 by Cierra Martinez RN  Safety Promotion/Fall Prevention: safety round/check completed  Taken 4/12/2025 0808 by Cierra Martinez RN  Safety Promotion/Fall Prevention: safety round/check completed  Goal: Optimal Comfort and Wellbeing  Outcome: Met  Intervention: Monitor Pain and Promote Comfort  Recent Flowsheet Documentation  Taken 4/12/2025 0808 by Cierra Martinez RN  Pain Management Interventions: pain medication given  Goal: Readiness for Transition of Care  Outcome: Met     Problem: Skin Injury Risk Increased  Goal: Skin Health and Integrity  Outcome: Met  Intervention: Optimize Skin Protection  Recent Flowsheet Documentation  Taken 4/12/2025 0808 by Cierra Martinez RN  Activity Management: up ad hever     Problem: Postpartum (Vaginal Delivery)  Goal: Successful Parent Role Transition  Outcome: Met  Goal: Hemostasis  Outcome: Met  Goal: Absence of Infection Signs and Symptoms  Outcome: Met  Goal: Anesthesia/Sedation Recovery  Outcome: Met  Intervention: Optimize Anesthesia Recovery  Recent Flowsheet Documentation  Taken 4/12/2025 0938 by Cierra Martinez RN  Safety Promotion/Fall Prevention: safety round/check completed  Taken 4/12/2025 0808 by Cierra Martinez RN  Safety Promotion/Fall Prevention: safety round/check completed  Goal: Optimal Pain Control and Function  Outcome: Met  Intervention: Prevent or Manage Pain  Recent Flowsheet Documentation  Taken 4/12/2025 0808 by Cierra Martinez RN  Pain Management Interventions: pain medication given  Goal: Effective Urinary Elimination  Outcome: Met   Goal Outcome Evaluation:

## 2025-04-13 ENCOUNTER — MATERNAL SCREENING (OUTPATIENT)
Dept: CALL CENTER | Facility: HOSPITAL | Age: 30
End: 2025-04-13
Payer: COMMERCIAL

## 2025-04-13 NOTE — OUTREACH NOTE
Maternal Screening Survey      Flowsheet Row Responses   Eligibility Eligible   Prep survey completed? Yes   Facility patient discharged from? [facility] - Registered Nurse

## 2025-04-20 ENCOUNTER — MATERNAL SCREENING (OUTPATIENT)
Dept: CALL CENTER | Facility: HOSPITAL | Age: 30
End: 2025-04-20
Payer: COMMERCIAL

## 2025-04-20 NOTE — OUTREACH NOTE
Maternal Screening Survey      Flowsheet Row Responses   Facility patient discharged from? [place]   Attempt successful? No   Unsuccessful attempts Attempt 1              [facility] - Registered Nurse

## 2025-04-21 ENCOUNTER — MATERNAL SCREENING (OUTPATIENT)
Dept: CALL CENTER | Facility: HOSPITAL | Age: 30
End: 2025-04-21
Payer: COMMERCIAL

## 2025-04-21 NOTE — OUTREACH NOTE
Maternal Screening Survey      Flowsheet Row Responses   Facility patient discharged from? [facility]   Attempt successful? No   Unsuccessful attempts Attempt 2              Cristina ENRIQUEZ - Registered Nurse

## 2025-04-22 ENCOUNTER — MATERNAL SCREENING (OUTPATIENT)
Dept: CALL CENTER | Facility: HOSPITAL | Age: 30
End: 2025-04-22
Payer: COMMERCIAL

## 2025-04-22 NOTE — OUTREACH NOTE
Maternal Screening Survey      Flowsheet Row Responses   Facility [facility] discharged from? [facility]   Attempt successful? No   Unsuccessful attempts Attempt 3   Revoke Decline to participate              JESSICA [facility] - Registered Nurse